# Patient Record
Sex: MALE | Race: WHITE | NOT HISPANIC OR LATINO | URBAN - METROPOLITAN AREA
[De-identification: names, ages, dates, MRNs, and addresses within clinical notes are randomized per-mention and may not be internally consistent; named-entity substitution may affect disease eponyms.]

---

## 2017-12-01 ENCOUNTER — EMERGENCY (EMERGENCY)
Facility: HOSPITAL | Age: 82
LOS: 1 days | Discharge: HOME | End: 2017-12-01
Attending: EMERGENCY MEDICINE | Admitting: EMERGENCY MEDICINE
Payer: MEDICARE

## 2017-12-01 VITALS
RESPIRATION RATE: 18 BRPM | OXYGEN SATURATION: 97 % | TEMPERATURE: 98 F | HEART RATE: 93 BPM | DIASTOLIC BLOOD PRESSURE: 96 MMHG | SYSTOLIC BLOOD PRESSURE: 149 MMHG

## 2017-12-01 DIAGNOSIS — S42.001A FRACTURE OF UNSPECIFIED PART OF RIGHT CLAVICLE, INITIAL ENCOUNTER FOR CLOSED FRACTURE: ICD-10-CM

## 2017-12-01 DIAGNOSIS — N39.0 URINARY TRACT INFECTION, SITE NOT SPECIFIED: ICD-10-CM
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
## 2017-12-01 DIAGNOSIS — R07.89 OTHER CHEST PAIN: ICD-10-CM

## 2017-12-01 DIAGNOSIS — B20 HUMAN IMMUNODEFICIENCY VIRUS [HIV] DISEASE: ICD-10-CM
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
## 2017-12-01 DIAGNOSIS — S09.90XA UNSPECIFIED INJURY OF HEAD, INITIAL ENCOUNTER: ICD-10-CM

## 2017-12-01 DIAGNOSIS — Z88.1 ALLERGY STATUS TO OTHER ANTIBIOTIC AGENTS STATUS: ICD-10-CM

## 2017-12-01 DIAGNOSIS — M25.552 PAIN IN LEFT HIP: ICD-10-CM

## 2017-12-01 LAB
ALBUMIN SERPL ELPH-MCNC: 4 G/DL — SIGNIFICANT CHANGE UP (ref 3.3–5)
ALP SERPL-CCNC: 61 U/L — SIGNIFICANT CHANGE UP (ref 40–120)
ALT FLD-CCNC: 22 U/L — SIGNIFICANT CHANGE UP (ref 10–45)
ANION GAP SERPL CALC-SCNC: 14 MMOL/L — SIGNIFICANT CHANGE UP (ref 5–17)
APPEARANCE UR: (no result)
AST SERPL-CCNC: 27 U/L — SIGNIFICANT CHANGE UP (ref 10–40)
BASOPHILS NFR BLD AUTO: 0.2 % — SIGNIFICANT CHANGE UP (ref 0–2)
BILIRUB SERPL-MCNC: 0.5 MG/DL — SIGNIFICANT CHANGE UP (ref 0.2–1.2)
BILIRUB UR-MCNC: NEGATIVE — SIGNIFICANT CHANGE UP
BUN SERPL-MCNC: 35 MG/DL — HIGH (ref 7–23)
CALCIUM SERPL-MCNC: 9.2 MG/DL — SIGNIFICANT CHANGE UP (ref 8.4–10.5)
CHLORIDE SERPL-SCNC: 102 MMOL/L — SIGNIFICANT CHANGE UP (ref 96–108)
CO2 SERPL-SCNC: 22 MMOL/L — SIGNIFICANT CHANGE UP (ref 22–31)
COLOR SPEC: YELLOW — SIGNIFICANT CHANGE UP
CREAT SERPL-MCNC: 1.63 MG/DL — HIGH (ref 0.5–1.3)
DIFF PNL FLD: (no result)
EOSINOPHIL NFR BLD AUTO: 0.8 % — SIGNIFICANT CHANGE UP (ref 0–6)
GLUCOSE SERPL-MCNC: 112 MG/DL — HIGH (ref 70–99)
GLUCOSE UR QL: NEGATIVE — SIGNIFICANT CHANGE UP
HCT VFR BLD CALC: 37.4 % — LOW (ref 39–50)
HGB BLD-MCNC: 12.7 G/DL — LOW (ref 13–17)
KETONES UR-MCNC: 15 MG/DL
LEUKOCYTE ESTERASE UR-ACNC: (no result)
LYMPHOCYTES # BLD AUTO: 13.7 % — SIGNIFICANT CHANGE UP (ref 13–44)
MCHC RBC-ENTMCNC: 32.5 PG — SIGNIFICANT CHANGE UP (ref 27–34)
MCHC RBC-ENTMCNC: 34 G/DL — SIGNIFICANT CHANGE UP (ref 32–36)
MCV RBC AUTO: 95.7 FL — SIGNIFICANT CHANGE UP (ref 80–100)
MONOCYTES NFR BLD AUTO: 11.4 % — SIGNIFICANT CHANGE UP (ref 2–14)
NEUTROPHILS NFR BLD AUTO: 73.9 % — SIGNIFICANT CHANGE UP (ref 43–77)
NITRITE UR-MCNC: POSITIVE
PH UR: 6 — SIGNIFICANT CHANGE UP (ref 5–8)
PLATELET # BLD AUTO: 246 K/UL — SIGNIFICANT CHANGE UP (ref 150–400)
POTASSIUM SERPL-MCNC: 4.6 MMOL/L — SIGNIFICANT CHANGE UP (ref 3.5–5.3)
POTASSIUM SERPL-SCNC: 4.6 MMOL/L — SIGNIFICANT CHANGE UP (ref 3.5–5.3)
PROT SERPL-MCNC: 7.6 G/DL — SIGNIFICANT CHANGE UP (ref 6–8.3)
PROT UR-MCNC: NEGATIVE MG/DL — SIGNIFICANT CHANGE UP
RBC # BLD: 3.91 M/UL — LOW (ref 4.2–5.8)
RBC # FLD: 12.5 % — SIGNIFICANT CHANGE UP (ref 10.3–16.9)
SODIUM SERPL-SCNC: 138 MMOL/L — SIGNIFICANT CHANGE UP (ref 135–145)
SP GR SPEC: >=1.03 — SIGNIFICANT CHANGE UP (ref 1–1.03)
UROBILINOGEN FLD QL: 0.2 E.U./DL — SIGNIFICANT CHANGE UP
WBC # BLD: 10 K/UL — SIGNIFICANT CHANGE UP (ref 3.8–10.5)
WBC # FLD AUTO: 10 K/UL — SIGNIFICANT CHANGE UP (ref 3.8–10.5)

## 2017-12-01 PROCEDURE — 87086 URINE CULTURE/COLONY COUNT: CPT

## 2017-12-01 PROCEDURE — 70450 CT HEAD/BRAIN W/O DYE: CPT

## 2017-12-01 PROCEDURE — 70450 CT HEAD/BRAIN W/O DYE: CPT | Mod: 26

## 2017-12-01 PROCEDURE — 99284 EMERGENCY DEPT VISIT MOD MDM: CPT

## 2017-12-01 PROCEDURE — 71250 CT THORAX DX C-: CPT | Mod: 26

## 2017-12-01 PROCEDURE — 96374 THER/PROPH/DIAG INJ IV PUSH: CPT

## 2017-12-01 PROCEDURE — 99284 EMERGENCY DEPT VISIT MOD MDM: CPT | Mod: 25

## 2017-12-01 PROCEDURE — 81001 URINALYSIS AUTO W/SCOPE: CPT

## 2017-12-01 PROCEDURE — 36415 COLL VENOUS BLD VENIPUNCTURE: CPT

## 2017-12-01 PROCEDURE — 73523 X-RAY EXAM HIPS BI 5/> VIEWS: CPT

## 2017-12-01 PROCEDURE — 73523 X-RAY EXAM HIPS BI 5/> VIEWS: CPT | Mod: 26

## 2017-12-01 PROCEDURE — 85025 COMPLETE CBC W/AUTO DIFF WBC: CPT

## 2017-12-01 PROCEDURE — 87040 BLOOD CULTURE FOR BACTERIA: CPT

## 2017-12-01 PROCEDURE — 71250 CT THORAX DX C-: CPT

## 2017-12-01 PROCEDURE — 87186 SC STD MICRODIL/AGAR DIL: CPT

## 2017-12-01 PROCEDURE — 80053 COMPREHEN METABOLIC PANEL: CPT

## 2017-12-01 RX ORDER — ACETAMINOPHEN 500 MG
650 TABLET ORAL ONCE
Qty: 0 | Refills: 0 | Status: COMPLETED | OUTPATIENT
Start: 2017-12-01 | End: 2017-12-01

## 2017-12-01 RX ORDER — CEPHALEXIN 500 MG
1 CAPSULE ORAL
Qty: 14 | Refills: 0 | OUTPATIENT
Start: 2017-12-01 | End: 2017-12-08

## 2017-12-01 RX ORDER — CEFTRIAXONE 500 MG/1
1 INJECTION, POWDER, FOR SOLUTION INTRAMUSCULAR; INTRAVENOUS ONCE
Qty: 0 | Refills: 0 | Status: COMPLETED | OUTPATIENT
Start: 2017-12-01 | End: 2017-12-01

## 2017-12-01 RX ADMIN — Medication 650 MILLIGRAM(S): at 23:30

## 2017-12-01 RX ADMIN — CEFTRIAXONE 100 GRAM(S): 500 INJECTION, POWDER, FOR SOLUTION INTRAMUSCULAR; INTRAVENOUS at 23:35

## 2017-12-01 NOTE — ED PROVIDER NOTE - OBJECTIVE STATEMENT
84 yo  male with h/o HIV, in the ER after mechanical fall at the hallway of his building earlier today.   Pt reports that he had difficulty to got up. C/o head injury, but no LOC, c/o pain to his  right anterior upper chest, left hip. Pt denies nausea, vomiting, SOB, abdominal pain, fever or chills. Pt reports that he slipped on the floor. Had another fall last week on the stairs. Pt also reports that last month her was treated for pneumonia

## 2017-12-01 NOTE — ED PROVIDER NOTE - CARE PLAN
Principal Discharge DX:	Clavicular fracture  Secondary Diagnosis:	HIV (human immunodeficiency virus infection)  Secondary Diagnosis:	UTI (urinary tract infection)

## 2017-12-01 NOTE — ED ADULT NURSE NOTE - OBJECTIVE STATEMENT
s/p trip and fall at the [address] of his building with right collar bone pain hit his head as well denies loc, been falling recently fell twice last week

## 2017-12-01 NOTE — ED PROVIDER NOTE - MEDICAL DECISION MAKING DETAILS
82 yo male s/p mechanical fall, no LOC, able to ambulate after his fall, c/o pain to anterior  right upper chest. head CT- no acute pathology, chest CT- right clavicular comminuted fracture, no pneumothorax, no other acute findings.  Pt also found to have UTI and received first dose of abx. Pt stable for discharge. ambulate steady, A&O x 3, has PMD to f/u. returned precautions discussed.

## 2017-12-01 NOTE — ED PROVIDER NOTE - ATTENDING CONTRIBUTION TO CARE
83 yom pw fall, c/o pain to R upper chest.  no cp, no sob, no palpitations.      agree w/ PA, avss, no tachypnea, no hypoxia, ambulatory steadily, will check CT and xray 83 yom pw fall, c/o pain to R upper chest.  no cp, no sob, no palpitations.      agree w/ PA, avss, no tachypnea, no hypoxia, ambulatory steadily, will check CT and xray    noted fx, sling, no ptx on exam, ambulating steadily, seeking dc, return precautions given

## 2017-12-01 NOTE — ED PROVIDER NOTE - MUSCULOSKELETAL, MLM
Spine appears normal, range of motion is not limited,  right clavicular bone tenderness+,  mild diffuse left hip tenderness, NROM+

## 2017-12-01 NOTE — ED ADULT TRIAGE NOTE - CHIEF COMPLAINT QUOTE
pt c/o  fall which occurred earlier at home today , pt states " I tripped and fell on my Right side  and I also hit my head a little " pt c/o Right collar bone pain , pt also states ' I have been falling a lot lately fell twice last week

## 2017-12-02 VITALS
OXYGEN SATURATION: 95 % | RESPIRATION RATE: 16 BRPM | HEART RATE: 90 BPM | DIASTOLIC BLOOD PRESSURE: 86 MMHG | SYSTOLIC BLOOD PRESSURE: 126 MMHG | TEMPERATURE: 98 F

## 2017-12-04 LAB
-  AMPICILLIN/SULBACTAM: SIGNIFICANT CHANGE UP
-  AMPICILLIN: SIGNIFICANT CHANGE UP
-  CEFAZOLIN: SIGNIFICANT CHANGE UP
-  CEFTRIAXONE: SIGNIFICANT CHANGE UP
-  CIPROFLOXACIN: SIGNIFICANT CHANGE UP
-  GENTAMICIN: SIGNIFICANT CHANGE UP
-  NITROFURANTOIN: SIGNIFICANT CHANGE UP
-  PIPERACILLIN/TAZOBACTAM: SIGNIFICANT CHANGE UP
-  TOBRAMYCIN: SIGNIFICANT CHANGE UP
-  TRIMETHOPRIM/SULFAMETHOXAZOLE: SIGNIFICANT CHANGE UP
CULTURE RESULTS: SIGNIFICANT CHANGE UP
METHOD TYPE: SIGNIFICANT CHANGE UP
ORGANISM # SPEC MICROSCOPIC CNT: SIGNIFICANT CHANGE UP
ORGANISM # SPEC MICROSCOPIC CNT: SIGNIFICANT CHANGE UP
SPECIMEN SOURCE: SIGNIFICANT CHANGE UP

## 2017-12-05 RX ORDER — METHENAMINE MANDELATE 1 G
1 TABLET ORAL
Qty: 14 | Refills: 0 | OUTPATIENT
Start: 2017-12-05 | End: 2017-12-12

## 2017-12-07 LAB
CULTURE RESULTS: SIGNIFICANT CHANGE UP
CULTURE RESULTS: SIGNIFICANT CHANGE UP
SPECIMEN SOURCE: SIGNIFICANT CHANGE UP
SPECIMEN SOURCE: SIGNIFICANT CHANGE UP

## 2019-01-31 ENCOUNTER — INPATIENT (INPATIENT)
Facility: HOSPITAL | Age: 84
LOS: 2 days | Discharge: NURSING, REHAB OR LONG-TERM CARE | DRG: 871 | End: 2019-02-03
Attending: INTERNAL MEDICINE | Admitting: INTERNAL MEDICINE
Payer: MEDICARE

## 2019-01-31 VITALS
SYSTOLIC BLOOD PRESSURE: 90 MMHG | OXYGEN SATURATION: 99 % | TEMPERATURE: 97 F | RESPIRATION RATE: 18 BRPM | DIASTOLIC BLOOD PRESSURE: 58 MMHG | HEART RATE: 96 BPM

## 2019-01-31 DIAGNOSIS — B20 HUMAN IMMUNODEFICIENCY VIRUS [HIV] DISEASE: ICD-10-CM

## 2019-01-31 DIAGNOSIS — J15.9 UNSPECIFIED BACTERIAL PNEUMONIA: ICD-10-CM

## 2019-01-31 DIAGNOSIS — R53.1 WEAKNESS: ICD-10-CM

## 2019-01-31 DIAGNOSIS — A41.9 SEPSIS, UNSPECIFIED ORGANISM: ICD-10-CM

## 2019-01-31 DIAGNOSIS — N19 UNSPECIFIED KIDNEY FAILURE: ICD-10-CM

## 2019-01-31 DIAGNOSIS — D64.9 ANEMIA, UNSPECIFIED: ICD-10-CM

## 2019-01-31 DIAGNOSIS — R79.89 OTHER SPECIFIED ABNORMAL FINDINGS OF BLOOD CHEMISTRY: ICD-10-CM

## 2019-01-31 DIAGNOSIS — Z91.89 OTHER SPECIFIED PERSONAL RISK FACTORS, NOT ELSEWHERE CLASSIFIED: ICD-10-CM

## 2019-01-31 DIAGNOSIS — R33.9 RETENTION OF URINE, UNSPECIFIED: ICD-10-CM

## 2019-01-31 DIAGNOSIS — R63.8 OTHER SYMPTOMS AND SIGNS CONCERNING FOOD AND FLUID INTAKE: ICD-10-CM

## 2019-01-31 LAB
ALBUMIN SERPL ELPH-MCNC: 3.3 G/DL — LOW (ref 3.4–5)
ALP SERPL-CCNC: 54 U/L — SIGNIFICANT CHANGE UP (ref 40–120)
ALT FLD-CCNC: 14 U/L — SIGNIFICANT CHANGE UP (ref 12–42)
ANION GAP SERPL CALC-SCNC: 15 MMOL/L — SIGNIFICANT CHANGE UP (ref 9–16)
APPEARANCE UR: CLEAR — SIGNIFICANT CHANGE UP
AST SERPL-CCNC: 24 U/L — SIGNIFICANT CHANGE UP (ref 15–37)
BASOPHILS NFR BLD AUTO: 0.2 % — SIGNIFICANT CHANGE UP (ref 0–2)
BILIRUB SERPL-MCNC: 0.4 MG/DL — SIGNIFICANT CHANGE UP (ref 0.2–1.2)
BILIRUB UR-MCNC: NEGATIVE — SIGNIFICANT CHANGE UP
BUN SERPL-MCNC: 84 MG/DL — HIGH (ref 7–23)
CALCIUM SERPL-MCNC: 9.3 MG/DL — SIGNIFICANT CHANGE UP (ref 8.5–10.5)
CHLORIDE SERPL-SCNC: 110 MMOL/L — HIGH (ref 96–108)
CO2 SERPL-SCNC: 19 MMOL/L — LOW (ref 22–31)
COLOR SPEC: YELLOW — SIGNIFICANT CHANGE UP
CREAT SERPL-MCNC: 1.33 MG/DL — HIGH (ref 0.5–1.3)
DIFF PNL FLD: NEGATIVE — SIGNIFICANT CHANGE UP
EOSINOPHIL NFR BLD AUTO: 0.2 % — SIGNIFICANT CHANGE UP (ref 0–6)
FLU A RESULT: SIGNIFICANT CHANGE UP
FLU A RESULT: SIGNIFICANT CHANGE UP
FLUAV AG NPH QL: SIGNIFICANT CHANGE UP
FLUBV AG NPH QL: SIGNIFICANT CHANGE UP
GLUCOSE SERPL-MCNC: 142 MG/DL — HIGH (ref 70–99)
GLUCOSE UR QL: NEGATIVE — SIGNIFICANT CHANGE UP
HCT VFR BLD CALC: 30.6 % — LOW (ref 39–50)
HGB BLD-MCNC: 10.3 G/DL — LOW (ref 13–17)
IMM GRANULOCYTES NFR BLD AUTO: 0.7 % — SIGNIFICANT CHANGE UP (ref 0–1.5)
KETONES UR-MCNC: 15 MG/DL
LACTATE SERPL-SCNC: 1.2 MMOL/L — SIGNIFICANT CHANGE UP (ref 0.4–2)
LACTATE SERPL-SCNC: 2.8 MMOL/L — HIGH (ref 0.4–2)
LEUKOCYTE ESTERASE UR-ACNC: NEGATIVE — SIGNIFICANT CHANGE UP
LYMPHOCYTES # BLD AUTO: 7.7 % — LOW (ref 13–44)
MAGNESIUM SERPL-MCNC: 2.1 MG/DL — SIGNIFICANT CHANGE UP (ref 1.6–2.6)
MCHC RBC-ENTMCNC: 33.3 PG — SIGNIFICANT CHANGE UP (ref 27–34)
MCHC RBC-ENTMCNC: 33.7 G/DL — SIGNIFICANT CHANGE UP (ref 32–36)
MCV RBC AUTO: 99 FL — SIGNIFICANT CHANGE UP (ref 80–100)
MONOCYTES NFR BLD AUTO: 7.2 % — SIGNIFICANT CHANGE UP (ref 2–14)
NEUTROPHILS NFR BLD AUTO: 84 % — HIGH (ref 43–77)
NITRITE UR-MCNC: NEGATIVE — SIGNIFICANT CHANGE UP
PCP SPEC-MCNC: SIGNIFICANT CHANGE UP
PH UR: 5.5 — SIGNIFICANT CHANGE UP (ref 5–8)
PHOSPHATE SERPL-MCNC: 3.2 MG/DL — SIGNIFICANT CHANGE UP (ref 2.5–4.5)
PLATELET # BLD AUTO: 276 K/UL — SIGNIFICANT CHANGE UP (ref 150–400)
POTASSIUM SERPL-MCNC: 4.1 MMOL/L — SIGNIFICANT CHANGE UP (ref 3.5–5.3)
POTASSIUM SERPL-SCNC: 4.1 MMOL/L — SIGNIFICANT CHANGE UP (ref 3.5–5.3)
PROT SERPL-MCNC: 6.3 G/DL — LOW (ref 6.4–8.2)
PROT UR-MCNC: NEGATIVE MG/DL — SIGNIFICANT CHANGE UP
RAPID RVP RESULT: SIGNIFICANT CHANGE UP
RBC # BLD: 3.09 M/UL — LOW (ref 4.2–5.8)
RBC # FLD: 12.8 % — SIGNIFICANT CHANGE UP (ref 10.3–14.5)
RSV RESULT: SIGNIFICANT CHANGE UP
RSV RNA RESP QL NAA+PROBE: SIGNIFICANT CHANGE UP
SODIUM SERPL-SCNC: 144 MMOL/L — SIGNIFICANT CHANGE UP (ref 132–145)
SP GR SPEC: 1.02 — SIGNIFICANT CHANGE UP (ref 1–1.03)
TROPONIN I SERPL-MCNC: 0.03 NG/ML — SIGNIFICANT CHANGE UP (ref 0.02–0.06)
UROBILINOGEN FLD QL: 0.2 E.U./DL — SIGNIFICANT CHANGE UP
WBC # BLD: 12.1 K/UL — HIGH (ref 3.8–10.5)
WBC # FLD AUTO: 12.1 K/UL — HIGH (ref 3.8–10.5)

## 2019-01-31 PROCEDURE — 99223 1ST HOSP IP/OBS HIGH 75: CPT | Mod: GC

## 2019-01-31 PROCEDURE — 93010 ELECTROCARDIOGRAM REPORT: CPT

## 2019-01-31 PROCEDURE — 71045 X-RAY EXAM CHEST 1 VIEW: CPT | Mod: 26,77

## 2019-01-31 PROCEDURE — 99285 EMERGENCY DEPT VISIT HI MDM: CPT | Mod: 25

## 2019-01-31 PROCEDURE — 71045 X-RAY EXAM CHEST 1 VIEW: CPT | Mod: 26

## 2019-01-31 PROCEDURE — 70450 CT HEAD/BRAIN W/O DYE: CPT | Mod: 26

## 2019-01-31 RX ORDER — PIPERACILLIN AND TAZOBACTAM 4; .5 G/20ML; G/20ML
3.38 INJECTION, POWDER, LYOPHILIZED, FOR SOLUTION INTRAVENOUS EVERY 6 HOURS
Qty: 0 | Refills: 0 | Status: DISCONTINUED | OUTPATIENT
Start: 2019-01-31 | End: 2019-02-02

## 2019-01-31 RX ORDER — EMTRICITABINE, RILPIVIRINE HYDROCHLORIDE, AND TENOFOVIR DISOPROXIL FUMARATE 200; 25; 300 MG/1; MG/1; MG/1
1 TABLET, FILM COATED ORAL DAILY
Qty: 0 | Refills: 0 | Status: DISCONTINUED | OUTPATIENT
Start: 2019-01-31 | End: 2019-02-03

## 2019-01-31 RX ORDER — VANCOMYCIN HCL 1 G
1000 VIAL (EA) INTRAVENOUS DAILY
Qty: 0 | Refills: 0 | Status: DISCONTINUED | OUTPATIENT
Start: 2019-01-31 | End: 2019-02-01

## 2019-01-31 RX ORDER — AZITHROMYCIN 500 MG/1
500 TABLET, FILM COATED ORAL ONCE
Qty: 0 | Refills: 0 | Status: COMPLETED | OUTPATIENT
Start: 2019-01-31 | End: 2019-01-31

## 2019-01-31 RX ORDER — SODIUM CHLORIDE 9 MG/ML
1000 INJECTION INTRAMUSCULAR; INTRAVENOUS; SUBCUTANEOUS ONCE
Qty: 0 | Refills: 0 | Status: COMPLETED | OUTPATIENT
Start: 2019-01-31 | End: 2019-01-31

## 2019-01-31 RX ORDER — ACETAMINOPHEN 500 MG
650 TABLET ORAL ONCE
Qty: 0 | Refills: 0 | Status: COMPLETED | OUTPATIENT
Start: 2019-01-31 | End: 2019-01-31

## 2019-01-31 RX ORDER — VANCOMYCIN HCL 1 G
VIAL (EA) INTRAVENOUS
Qty: 0 | Refills: 0 | Status: DISCONTINUED | OUTPATIENT
Start: 2019-01-31 | End: 2019-01-31

## 2019-01-31 RX ORDER — HEPARIN SODIUM 5000 [USP'U]/ML
5000 INJECTION INTRAVENOUS; SUBCUTANEOUS EVERY 8 HOURS
Qty: 0 | Refills: 0 | Status: DISCONTINUED | OUTPATIENT
Start: 2019-01-31 | End: 2019-02-03

## 2019-01-31 RX ORDER — CEFTRIAXONE 500 MG/1
1 INJECTION, POWDER, FOR SOLUTION INTRAMUSCULAR; INTRAVENOUS ONCE
Qty: 0 | Refills: 0 | Status: COMPLETED | OUTPATIENT
Start: 2019-01-31 | End: 2019-01-31

## 2019-01-31 RX ADMIN — SODIUM CHLORIDE 1000 MILLILITER(S): 9 INJECTION INTRAMUSCULAR; INTRAVENOUS; SUBCUTANEOUS at 15:46

## 2019-01-31 RX ADMIN — AZITHROMYCIN 500 MILLIGRAM(S): 500 TABLET, FILM COATED ORAL at 15:46

## 2019-01-31 RX ADMIN — CEFTRIAXONE 1 GRAM(S): 500 INJECTION, POWDER, FOR SOLUTION INTRAMUSCULAR; INTRAVENOUS at 12:30

## 2019-01-31 RX ADMIN — Medication 650 MILLIGRAM(S): at 16:33

## 2019-01-31 RX ADMIN — SODIUM CHLORIDE 2000 MILLILITER(S): 9 INJECTION INTRAMUSCULAR; INTRAVENOUS; SUBCUTANEOUS at 11:42

## 2019-01-31 RX ADMIN — AZITHROMYCIN 255 MILLIGRAM(S): 500 TABLET, FILM COATED ORAL at 12:22

## 2019-01-31 RX ADMIN — CEFTRIAXONE 100 GRAM(S): 500 INJECTION, POWDER, FOR SOLUTION INTRAMUSCULAR; INTRAVENOUS at 11:41

## 2019-01-31 RX ADMIN — SODIUM CHLORIDE 1000 MILLILITER(S): 9 INJECTION INTRAMUSCULAR; INTRAVENOUS; SUBCUTANEOUS at 16:33

## 2019-01-31 RX ADMIN — Medication 166.67 MILLIGRAM(S): at 21:44

## 2019-01-31 RX ADMIN — EMTRICITABINE, RILPIVIRINE HYDROCHLORIDE, AND TENOFOVIR DISOPROXIL FUMARATE 1 TABLET(S): 200; 25; 300 TABLET, FILM COATED ORAL at 21:45

## 2019-01-31 RX ADMIN — SODIUM CHLORIDE 1000 MILLILITER(S): 9 INJECTION INTRAMUSCULAR; INTRAVENOUS; SUBCUTANEOUS at 13:19

## 2019-01-31 NOTE — H&P ADULT - PROBLEM SELECTOR PLAN 1
Patient met severe sepsis criteria on admission given WBC 12.1, HR >90, lactate 2.8. Likely source is PNA given recent dx as outpatient, productive cough, infiltrate on CXR.  Patient was on 10 day course of Bactrim IV and PO (unclear exact days of each).  Patient straight caths at home but UA negative.  R hand erythema but more consistent with hematoma from IVs, less likely phlebitis.  No discharge  - given recent IV antibiotics would cover for HAP, gram negative pneumonia.  Also risk of aspiration given weakness and workup for Parkinsons.    - s/p Ceftriaxone 1gm and Azithromycin 500mg in the ED  - Vanc, Zosyn given risk factors as above  - repeat CXR  - s/p 3L NS in the ED, meets sepsis requirements  - lactate cleared from 2.8 to 1.2  - reperfusion exam performed  - f/u RVP, blood cxs Patient met severe sepsis criteria on admission given WBC 12.1 with neutrophil predom, HR >90, lactate 2.8. Likely source is PNA given recent dx as outpatient, productive cough, infiltrate on CXR.  Patient was on 10 day course of Bactrim IV and PO (unclear exact days of each).  Patient straight caths at home but UA negative.  R hand erythema but more consistent with hematoma from IVs, less likely phlebitis.  No discharge or warmth  - given recent IV antibiotics would cover for HAP, gram negative pneumonia.  Also risk of aspiration given weakness and workup for Parkinsons.    - s/p Ceftriaxone 1gm and Azithromycin 500mg in the ED  - Vanc 1000mg daily given Cr clearance 34, Zosyn 3.375g q6 given risk factors as above.  Likely can be de-escalated in AM   - repeat CXR  - s/p 3L NS in the ED, meets sepsis requirements  - lactate cleared from 2.8 to 1.2  - reperfusion exam performed  - f/u RVP, blood cxs  - f/u procalcitonin

## 2019-01-31 NOTE — ED PROVIDER NOTE - ATTENDING CONTRIBUTION TO CARE
Patient presenting with profound weakness in the setting of PNA tx x 10d as outpt, HIV with low CD4. VS- low BP, low grade temp, looks ill, non-toxic. Will tx as sepsis and check CXR. Likely admit.

## 2019-01-31 NOTE — ED PROVIDER NOTE - PHYSICAL EXAMINATION
R nare: enlarged flesh colored soft tissue mass that obstruct view of turbinates, no edema, no erythema,   no masses over throat, airway patent    L hand: large area of ecchymosis over medial aspect of dorsum, with central punture wound from venipuncture, mild surrounding erythema. no induration, no fluctuance

## 2019-01-31 NOTE — H&P ADULT - NSHPLABSRESULTS_GEN_ALL_CORE
LABS:                         10.3   12.1  )-----------( 276      ( 2019 11:26 )             30.6         144  |  110<H>  |  84<H>  ----------------------------<  142<H>  4.1   |  19<L>  |  1.33<H>    Ca    9.3      2019 11:26  Phos  3.2       Mg     2.1         TPro  6.3<L>  /  Alb  3.3<L>  /  TBili  0.4  /  DBili  x   /  AST  24  /  ALT  14  /  AlkPhos  54        Urinalysis Basic - ( 2019 12:22 )    Color: Yellow / Appearance: Clear / S.025 / pH: x  Gluc: x / Ketone: 15 mg/dL  / Bili: NEGATIVE / Urobili: 0.2 E.U./dL   Blood: x / Protein: NEGATIVE mg/dL / Nitrite: NEGATIVE   Leuk Esterase: NEGATIVE / RBC: x / WBC x   Sq Epi: x / Non Sq Epi: x / Bacteria: x      CARDIAC MARKERS ( 2019 11:26 )  0.028 ng/mL / x     / x     / x     / x            Lactate, Blood: 1.2 mmoL/L ( @ 14:06)  Lactate, Blood: 2.8 mmoL/L ( @ 11:26)      RADIOLOGY, EKG & ADDITIONAL TESTS: Reviewed.

## 2019-01-31 NOTE — H&P ADULT - PROBLEM SELECTOR PLAN 10
1) PCP Contacted on Admission: N --> Dr. Jacobo [phone], voicemail left for PCP  2) Date of Contact with PCP:  3) PCP Contacted at Discharge: (Y/N)  4) Summary of Handoff Given to PCP:   5) Post-Discharge Appointment Date and Location:

## 2019-01-31 NOTE — H&P ADULT - HISTORY OF PRESENT ILLNESS
85 yo M with PMH of HIV and chronic weakness being worked up for Parkinsons as an outpatient who comes in by EMS for acutely worsening weakness in the setting of current tx for PNA.  Patient reports that he was dx with PNA by his PCP last week and has been taking a 10 day course of Bactrim (combination of IV daily at PCP's office and transition to oral but unclear duration of IV vs oral).  Patient endorses a productive cough of light colored sputum and has had a runny nose which he reports is chronic.  Denies fevers, chills, CP, SOB, nausea, vomiting, abdominal pain or diarrhea.  He reports that in the last 24 hours he had acute worsening of fatigue and weakness which prompted his friends to be concerned and encourage him to go to the ED. He reports a history of generalized weakness for which he is being worked up as an outpatient for Parkinsons.    In the ED T max 99.4, HR 96, BP 90/58, RR 18, 99% on RA.  Patient received tylenol, azithromycin, ceftriaxone and 3L NS.  Labs significant for WBC 12.1 with neutrophil predominance, BUN/Cr 84/1.33, lactate 2.8, troponins negative.

## 2019-01-31 NOTE — ED PROVIDER NOTE - PROGRESS NOTE DETAILS
called PCP Dr Jacobo's office at  message left. called PCP Dr Jacobo's office at  message left. spoke with  Luis who states patient had been in the office almost daily for treatment of pneumonia for the past week. garret takes odessey, compliant with medications, last VL undetectably, last CD4 >200 does not know exact number or when he last check but is compliant with follow up.  patient does not want HIV status discussed in the presence of friend at bedside. patient takes odessey, compliant with medications, last VL undetectable, last CD4 >200 does not know exact number or when he last check but is compliant with follow up.  patient does not want HIV status discussed in the presence of friend at bedside.

## 2019-01-31 NOTE — H&P ADULT - NSHPREVIEWOFSYSTEMS_GEN_ALL_CORE
REVIEW OF SYSTEMS:    CONSTITUTIONAL: + weakness, No fevers or chills  EYES/ENT: + rhinorrhea, No visual changes;  No vertigo or throat pain   NECK: No pain or stiffness  RESPIRATORY: + cough, No wheezing or hemoptysis; No shortness of breath  CARDIOVASCULAR: No chest pain or palpitations  GASTROINTESTINAL: No abdominal or epigastric pain. No nausea, vomiting, or hematemesis; No diarrhea or constipation. No melena or hematochezia.  GENITOURINARY: + urinary retention, No dysuria, frequency or hematuria  NEUROLOGICAL: + weakness, No numbness  SKIN: No itching, burning, rashes, or lesions   All other review of systems is negative unless indicated above.

## 2019-01-31 NOTE — H&P ADULT - PROBLEM SELECTOR PLAN 7
Per  at PCP's office patient VL undetectable and CD4 > 200  - continue with Odefsey daily  - f/u with PCP to verify  - Patient would prefer that HIV status not be mentioned in front of visitors

## 2019-01-31 NOTE — H&P ADULT - PROBLEM SELECTOR PLAN 4
BUN elevated to 84 on admission, no signs of active bleeding.  - likely in the setting of ketogenic diet with possible GIANCARLO  - continue to monitor

## 2019-01-31 NOTE — ED PROVIDER NOTE - OBJECTIVE STATEMENT
PMHx HIV on antivirals BIB EMS for generalized weakness and profound fatigue worsening over the past 12-24 hours. patient was dx with PNA 10 days ago had been on bactrim x 4 days and had been getting IV abx at his PMD's office last dose 4 days ago. patient was able to follow up with his physician yesturday but found that he was profoundly weak today. states that he has been having issues with gait and dizziness x a few years and is being worked up for Parkinson's PMHx HIV on antivirals BIB EMS for generalized weakness and profound fatigue worsening over the past 12-24 hours. patient was dx with PNA 10 days ago had been on bactrim x 4 days and had been getting IV abx at his PMD's office last dose 4 days ago. patient was able to follow up with his physician yesterday but found that he was profoundly weak today. states that he has been having issues with gait and dizziness x a few years and is being worked up for Parkinson's

## 2019-01-31 NOTE — H&P ADULT - PROBLEM SELECTOR PLAN 3
Cr 1.33 on admission, no known baseline.  Patient with poor PO intake over last several days  - s/p 3L IVF in ED  - renally dose medications  - trend BMP

## 2019-01-31 NOTE — ED ADULT NURSE NOTE - OBJECTIVE STATEMENT
Pt presents to ED with c/o f/c and generalized weakness- currently being treated as OP for PNA, + productive cough

## 2019-01-31 NOTE — ED ADULT TRIAGE NOTE - CHIEF COMPLAINT QUOTE
Patient complaining of general weakness and unsteady gait for the last 12 hrs. Stroke code initiated.

## 2019-01-31 NOTE — H&P ADULT - PROBLEM SELECTOR PLAN 6
Hgb 10.3, 12.7 on labs 1 year ago  Normocytic, no signs of active bleeding.  Possibly in the setting of chronic disease HIV  - f/u iron panel  - monitor for signs and sxs of bleeding  - CT head negative for bleed

## 2019-01-31 NOTE — H&P ADULT - NSHPSOCIALHISTORY_GEN_ALL_CORE
Denies ever smoking, no EtOH, denies illicit drug usage.    Patient lives alone and performs his ADLS

## 2019-01-31 NOTE — ED PROVIDER NOTE - MEDICAL DECISION MAKING DETAILS
Patient PMHx HIV with PNA on bactrim x 4 days BIB EMS for generalized weakness and profound fatigue, no fall, no LOC. had been getting IV ABX in PCP's office. Patient PMHx HIV with PNA on bactrim x 4 days BIB EMS for generalized weakness and profound fatigue, no fall, no LOC. had been getting IV ABX in PCP's office. patient with PNA failing out patient PO and IV tx will likely need admission. will repeat xray, check labs, and contact PCP to discussed management plan

## 2019-01-31 NOTE — H&P ADULT - ASSESSMENT
85 yo M with PMH of HIV and chronic weakness being worked up for Parkinsons as an outpatient who comes in by EMS for acutely worsening weakness in the setting of current tx for PNA.  Patient meets severe sepsis criteria on admission with likely source of PNA.  Covering for HAP and gram negative PNA given recent IV abx, aspiration risk.  UA negative for UTI, less likely cellulitis or phlebitis at site of prior IV.

## 2019-01-31 NOTE — PATIENT PROFILE ADULT - NSPROMUTANXFEARADDRESSFT_GEN_A_NUR
[facility]... Recommended weight and BMI control through healthy diet and exercise, green leafy veggies, UV protection, and not smoking. Reviewed the benefits of AREDS VITAMINS VERUS GENOTYPE directed vitamin therapy, and recommended following one or the other to try and prevent the progression of the disease. I advised if patient smokes or has ever smoked to avoid high doses of vitamin A (beta carotene) due to increased risk of lung cancer. Reviewed the importance of daily monitoring of the vision in each eye independently, along with the use of the Amsler grid daily and instructed patient to call and return immediately for any new changes in their vision or on the Amsler grid. Patient instructed on the importance of regular follow up and monitoring for the early detection of conversion to wet AMD as early detection results in early treatment and better outcomes. None

## 2019-01-31 NOTE — H&P ADULT - PROBLEM SELECTOR PLAN 8
Hx of urinary retention at home requiring self straight cath for urination  - UA negative  - c/w straight cath q6

## 2019-01-31 NOTE — H&P ADULT - PROBLEM SELECTOR PLAN 2
- Coverage for gram negative and HAP as above  - Unlikely to be PCP PNA given CD4 > 200 at last known testing, compliance with medications and unilateral infiltrate on CXR

## 2019-01-31 NOTE — H&P ADULT - PROBLEM SELECTOR PLAN 5
Patient with generalized weakness. Reports he has been undergoing workup for Parkinson's as an outpatient  - PT consult  - Fall precautions Patient with generalized weakness. Reports he has been undergoing workup for Parkinson's as an outpatient  - PT consult  - Speech and swallow dysphagia screen  - Fall precautions Patient with generalized weakness. Reports he has been undergoing workup for Parkinson's as an outpatient  - CT head negative for acute pathology or bleed  - PT consult  - Speech and swallow dysphagia screen  - Fall precautions

## 2019-01-31 NOTE — H&P ADULT - NSHPPHYSICALEXAM_GEN_ALL_CORE
PHYSICAL EXAM:  Constitutional: NAD, comfortable in bed, frail elderly male  HEENT: NC/AT, PERRLA, EOMI, no conjunctival pallor or scleral icterus, MMM  Neck: Supple, no JVD  Respiratory: Good inspiratory effort, no increased work of breathing. CTA B/L. No w/r/r.   Cardiovascular: RRR, normal S1 and S2, no m/r/g.   Gastrointestinal: +BS, distended, tympanic, soft NT, no guarding or rebound tenderness, no palpable masses   Extremities: R hand with ecchymosis at site of prior IV, no increased warmth or tenderness.  wwp; no cyanosis, clubbing or edema.   Vascular: Pulses equal and strong throughout.   Neurological: AAOx3, no CN deficits, 4/5 strength b/l LEs, 4/5 UE strength, 5/5  strenght.  Gait deferred.  Sensation intact  Skin: No gross skin abnormalities or rashes

## 2019-01-31 NOTE — H&P ADULT - PROBLEM SELECTOR PLAN 9
F: none  E: replete electrolytes K< 4, Mg <2  N: Mechanical soft  DVT PPx: Hep SQ  Code status: Full Code

## 2019-02-01 LAB
ANION GAP SERPL CALC-SCNC: 10 MMOL/L — SIGNIFICANT CHANGE UP (ref 5–17)
BUN SERPL-MCNC: 48 MG/DL — HIGH (ref 7–23)
CALCIUM SERPL-MCNC: 8.5 MG/DL — SIGNIFICANT CHANGE UP (ref 8.4–10.5)
CHLORIDE SERPL-SCNC: 116 MMOL/L — HIGH (ref 96–108)
CO2 SERPL-SCNC: 21 MMOL/L — LOW (ref 22–31)
CREAT SERPL-MCNC: 1.08 MG/DL — SIGNIFICANT CHANGE UP (ref 0.5–1.3)
CULTURE RESULTS: NO GROWTH — SIGNIFICANT CHANGE UP
FERRITIN SERPL-MCNC: 77 NG/ML — SIGNIFICANT CHANGE UP (ref 30–400)
GLUCOSE SERPL-MCNC: 100 MG/DL — HIGH (ref 70–99)
HCT VFR BLD CALC: 25.1 % — LOW (ref 39–50)
HGB BLD-MCNC: 8.2 G/DL — LOW (ref 13–17)
IRON SATN MFR SERPL: 36 % — SIGNIFICANT CHANGE UP (ref 16–55)
IRON SATN MFR SERPL: 74 UG/DL — SIGNIFICANT CHANGE UP (ref 45–165)
MAGNESIUM SERPL-MCNC: 2.1 MG/DL — SIGNIFICANT CHANGE UP (ref 1.6–2.6)
MCHC RBC-ENTMCNC: 32.7 G/DL — SIGNIFICANT CHANGE UP (ref 32–36)
MCHC RBC-ENTMCNC: 32.7 PG — SIGNIFICANT CHANGE UP (ref 27–34)
MCV RBC AUTO: 100 FL — SIGNIFICANT CHANGE UP (ref 80–100)
PHOSPHATE SERPL-MCNC: 2.9 MG/DL — SIGNIFICANT CHANGE UP (ref 2.5–4.5)
PLATELET # BLD AUTO: 246 K/UL — SIGNIFICANT CHANGE UP (ref 150–400)
POTASSIUM SERPL-MCNC: 3.7 MMOL/L — SIGNIFICANT CHANGE UP (ref 3.5–5.3)
POTASSIUM SERPL-SCNC: 3.7 MMOL/L — SIGNIFICANT CHANGE UP (ref 3.5–5.3)
PROCALCITONIN SERPL-MCNC: 0.17 NG/ML — HIGH (ref 0.02–0.1)
RBC # BLD: 2.51 M/UL — LOW (ref 4.2–5.8)
RBC # FLD: 12.7 % — SIGNIFICANT CHANGE UP (ref 10.3–16.9)
SODIUM SERPL-SCNC: 147 MMOL/L — HIGH (ref 135–145)
SPECIMEN SOURCE: SIGNIFICANT CHANGE UP
TIBC SERPL-MCNC: 208 UG/DL — LOW (ref 220–430)
TRANSFERRIN SERPL-MCNC: 172 MG/DL — LOW (ref 200–360)
UIBC SERPL-MCNC: 134 UG/DL — SIGNIFICANT CHANGE UP (ref 110–370)
WBC # BLD: 7.9 K/UL — SIGNIFICANT CHANGE UP (ref 3.8–10.5)
WBC # FLD AUTO: 7.9 K/UL — SIGNIFICANT CHANGE UP (ref 3.8–10.5)

## 2019-02-01 PROCEDURE — 99233 SBSQ HOSP IP/OBS HIGH 50: CPT | Mod: GC

## 2019-02-01 RX ORDER — DOCUSATE SODIUM 100 MG
100 CAPSULE ORAL DAILY
Qty: 0 | Refills: 0 | Status: DISCONTINUED | OUTPATIENT
Start: 2019-02-01 | End: 2019-02-03

## 2019-02-01 RX ORDER — ERGOCALCIFEROL 1.25 MG/1
50000 CAPSULE ORAL
Qty: 0 | Refills: 0 | Status: DISCONTINUED | OUTPATIENT
Start: 2019-02-01 | End: 2019-02-03

## 2019-02-01 RX ORDER — ACETAMINOPHEN 500 MG
650 TABLET ORAL ONCE
Qty: 0 | Refills: 0 | Status: COMPLETED | OUTPATIENT
Start: 2019-02-01 | End: 2019-02-01

## 2019-02-01 RX ORDER — SENNA PLUS 8.6 MG/1
2 TABLET ORAL AT BEDTIME
Qty: 0 | Refills: 0 | Status: DISCONTINUED | OUTPATIENT
Start: 2019-02-01 | End: 2019-02-03

## 2019-02-01 RX ORDER — ATORVASTATIN CALCIUM 80 MG/1
10 TABLET, FILM COATED ORAL AT BEDTIME
Qty: 0 | Refills: 0 | Status: DISCONTINUED | OUTPATIENT
Start: 2019-02-01 | End: 2019-02-03

## 2019-02-01 RX ORDER — SIMETHICONE 80 MG/1
80 TABLET, CHEWABLE ORAL THREE TIMES A DAY
Qty: 0 | Refills: 0 | Status: DISCONTINUED | OUTPATIENT
Start: 2019-02-01 | End: 2019-02-03

## 2019-02-01 RX ORDER — POTASSIUM CHLORIDE 20 MEQ
40 PACKET (EA) ORAL ONCE
Qty: 0 | Refills: 0 | Status: COMPLETED | OUTPATIENT
Start: 2019-02-01 | End: 2019-02-01

## 2019-02-01 RX ORDER — POLYETHYLENE GLYCOL 3350 17 G/17G
17 POWDER, FOR SOLUTION ORAL DAILY
Qty: 0 | Refills: 0 | Status: DISCONTINUED | OUTPATIENT
Start: 2019-02-01 | End: 2019-02-03

## 2019-02-01 RX ADMIN — Medication 40 MILLIEQUIVALENT(S): at 08:30

## 2019-02-01 RX ADMIN — ATORVASTATIN CALCIUM 10 MILLIGRAM(S): 80 TABLET, FILM COATED ORAL at 22:25

## 2019-02-01 RX ADMIN — PIPERACILLIN AND TAZOBACTAM 200 GRAM(S): 4; .5 INJECTION, POWDER, LYOPHILIZED, FOR SOLUTION INTRAVENOUS at 05:49

## 2019-02-01 RX ADMIN — Medication 100 MILLIGRAM(S): at 01:06

## 2019-02-01 RX ADMIN — EMTRICITABINE, RILPIVIRINE HYDROCHLORIDE, AND TENOFOVIR DISOPROXIL FUMARATE 1 TABLET(S): 200; 25; 300 TABLET, FILM COATED ORAL at 12:09

## 2019-02-01 RX ADMIN — PIPERACILLIN AND TAZOBACTAM 200 GRAM(S): 4; .5 INJECTION, POWDER, LYOPHILIZED, FOR SOLUTION INTRAVENOUS at 18:02

## 2019-02-01 RX ADMIN — Medication 650 MILLIGRAM(S): at 09:30

## 2019-02-01 RX ADMIN — Medication 650 MILLIGRAM(S): at 08:30

## 2019-02-01 RX ADMIN — SIMETHICONE 80 MILLIGRAM(S): 80 TABLET, CHEWABLE ORAL at 08:31

## 2019-02-01 RX ADMIN — SENNA PLUS 2 TABLET(S): 8.6 TABLET ORAL at 22:25

## 2019-02-01 RX ADMIN — PIPERACILLIN AND TAZOBACTAM 200 GRAM(S): 4; .5 INJECTION, POWDER, LYOPHILIZED, FOR SOLUTION INTRAVENOUS at 12:09

## 2019-02-01 RX ADMIN — SENNA PLUS 2 TABLET(S): 8.6 TABLET ORAL at 01:06

## 2019-02-01 RX ADMIN — PIPERACILLIN AND TAZOBACTAM 200 GRAM(S): 4; .5 INJECTION, POWDER, LYOPHILIZED, FOR SOLUTION INTRAVENOUS at 00:08

## 2019-02-01 RX ADMIN — POLYETHYLENE GLYCOL 3350 17 GRAM(S): 17 POWDER, FOR SOLUTION ORAL at 12:09

## 2019-02-01 RX ADMIN — ERGOCALCIFEROL 50000 UNIT(S): 1.25 CAPSULE ORAL at 18:03

## 2019-02-01 RX ADMIN — Medication 100 MILLIGRAM(S): at 12:09

## 2019-02-01 NOTE — PROGRESS NOTE ADULT - SUBJECTIVE AND OBJECTIVE BOX
OVERNIGHT EVENTS: HATTIE    SUBJECTIVE: Pt seen and examined at bedside. Endorses improvement in cough and decrease in sputum production (chalk-colored). Also complaining of constipation - had one BM yesterday, but it was small and required straining. Denies fever, chills, chest pain, shortness of breath, abdominal pain, n/v, numbness, weakness, tingling.     Vital Signs Last 12 Hrs  T(F): 97.8 (19 @ 05:42), Max: 99 (19 @ 22:00)  HR: 84 (19 @ 05:42) (77 - 84)  BP: 134/73 (19 @ 05:42) (130/75 - 134/73)  BP(mean): --  RR: 18 (19 @ 05:42) (18 - 20)  SpO2: 97% (19 @ 05:42) (97% - 99%)  I&O's Summary    2019 07:01  -  2019 07:00  --------------------------------------------------------  IN: 450 mL / OUT: 1950 mL / NET: -1500 mL    PHYSICAL EXAM:  Constitutional: Male, NAD, comfortable in bed.  HEENT: NC/AT, PERRLA, EOMI, no conjunctival pallor or scleral icterus, MMM  Neck: Supple, no JVD  Respiratory: Normal rate, rhythm, depth, effort. Decreased breath sounds on R side. No w/r/r. No egophony.    Cardiovascular: RRR, normal S1 and S2, no m/r/g.   Gastrointestinal: +BS, soft NTND, no guarding or rebound tenderness, no palpable masses   Extremities: wwp; no cyanosis, clubbing or edema. Ecchymosis on R hand.   Vascular: Pulses equal and strong throughout.   Neurological: AAOx3, no CN deficits, strength and sensation intact throughout.   Skin: No gross skin abnormalities or rashes    LABS:                        8.2    7.9   )-----------( 246      ( 2019 05:55 )             25.1     02-    147<H>  |  116<H>  |  48<H>  ----------------------------<  100<H>  3.7   |  21<L>  |  1.08    Ca    8.5      2019 05:55  Phos  2.9     -  Mg     2.1         TPro  6.3<L>  /  Alb  3.3<L>  /  TBili  0.4  /  DBili  x   /  AST  24  /  ALT  14  /  AlkPhos  54        Urinalysis Basic - ( 2019 12:22 )    Color: Yellow / Appearance: Clear / S.025 / pH: x  Gluc: x / Ketone: 15 mg/dL  / Bili: NEGATIVE / Urobili: 0.2 E.U./dL   Blood: x / Protein: NEGATIVE mg/dL / Nitrite: NEGATIVE   Leuk Esterase: NEGATIVE / RBC: x / WBC x   Sq Epi: x / Non Sq Epi: x / Bacteria: x    RADIOLOGY & ADDITIONAL TESTS:    < from: CT Head No Cont (19 @ 11:56) >  IMPRESSION: No intracranial hemorrhage or acute transcortical infarct.   Generalized volume loss with small vessel ischemic disease. 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 Polypoid soft tissue within the   right nasal cavity extending into the roof of the nasopharynx and   correlation with direct inspection is recommended.    < end of copied text >    MEDICATIONS  (STANDING):  docusate sodium 100 milliGRAM(s) Oral daily  emtricitabine 200 mG/rilpivirine 25 mG/tenofovir alafenamide 25 mG (ODEFSEY) Tablet 1 Tablet(s) Oral daily  heparin  Injectable 5000 Unit(s) SubCutaneous every 8 hours  piperacillin/tazobactam IVPB. 3.375 Gram(s) IV Intermittent every 6 hours  polyethylene glycol 3350 17 Gram(s) Oral daily  senna 2 Tablet(s) Oral at bedtime  vancomycin  IVPB 1000 milliGRAM(s) IV Intermittent daily    MEDICATIONS  (PRN):  simethicone 80 milliGRAM(s) Chew three times a day PRN Gas OVERNIGHT EVENTS: HATTIE    SUBJECTIVE: Pt seen and examined at bedside. Endorses improvement in cough and decrease in sputum production (chalk-colored). Also complaining of constipation - had one BM yesterday, but it was small and required straining. Denies fever, chills, chest pain, shortness of breath, abdominal pain, n/v, numbness, weakness, tingling.     Vital Signs Last 12 Hrs  T(F): 97.8 (19 @ 05:42), Max: 99 (19 @ 22:00)  HR: 84 (19 @ 05:42) (77 - 84)  BP: 134/73 (19 @ 05:42) (130/75 - 134/73)  BP(mean): --  RR: 18 (19 @ 05:42) (18 - 20)  SpO2: 97% (19 @ 05:42) (97% - 99%)  I&O's Summary    2019 07:01  -  2019 07:00  --------------------------------------------------------  IN: 450 mL / OUT: 1950 mL / NET: -1500 mL    PHYSICAL EXAM:  Constitutional: Male, NAD, comfortable in bed.  HEENT: NC/AT, PERRLA, EOMI, no conjunctival pallor or scleral icterus, MMM  Neck: Supple, no JVD  Respiratory: Normal rate, rhythm, depth, effort. Decreased breath sounds on R side. No w/r/r. No egophony.    Cardiovascular: RRR, normal S1 and S2, no m/r/g.   Gastrointestinal: +BS, soft NTND, no guarding or rebound tenderness, no palpable masses   Extremities: wwp; no cyanosis, clubbing or edema. Ecchymosis on L hand.   Vascular: Pulses equal and strong throughout.   Neurological: AAOx3, no CN deficits, strength and sensation intact throughout.   Skin: No gross skin abnormalities or rashes    LABS:                        8.2    7.9   )-----------( 246      ( 2019 05:55 )             25.1     02-    147<H>  |  116<H>  |  48<H>  ----------------------------<  100<H>  3.7   |  21<L>  |  1.08    Ca    8.5      2019 05:55  Phos  2.9     -  Mg     2.1         TPro  6.3<L>  /  Alb  3.3<L>  /  TBili  0.4  /  DBili  x   /  AST  24  /  ALT  14  /  AlkPhos  54        Urinalysis Basic - ( 2019 12:22 )    Color: Yellow / Appearance: Clear / S.025 / pH: x  Gluc: x / Ketone: 15 mg/dL  / Bili: NEGATIVE / Urobili: 0.2 E.U./dL   Blood: x / Protein: NEGATIVE mg/dL / Nitrite: NEGATIVE   Leuk Esterase: NEGATIVE / RBC: x / WBC x   Sq Epi: x / Non Sq Epi: x / Bacteria: x    RADIOLOGY & ADDITIONAL TESTS:    < from: CT Head No Cont (19 @ 11:56) >  IMPRESSION: No intracranial hemorrhage or acute transcortical infarct.   Generalized volume loss with small vessel ischemic disease. 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 Polypoid soft tissue within the   right nasal cavity extending into the roof of the nasopharynx and   correlation with direct inspection is recommended.    < end of copied text >    MEDICATIONS  (STANDING):  docusate sodium 100 milliGRAM(s) Oral daily  emtricitabine 200 mG/rilpivirine 25 mG/tenofovir alafenamide 25 mG (ODEFSEY) Tablet 1 Tablet(s) Oral daily  heparin  Injectable 5000 Unit(s) SubCutaneous every 8 hours  piperacillin/tazobactam IVPB. 3.375 Gram(s) IV Intermittent every 6 hours  polyethylene glycol 3350 17 Gram(s) Oral daily  senna 2 Tablet(s) Oral at bedtime  vancomycin  IVPB 1000 milliGRAM(s) IV Intermittent daily    MEDICATIONS  (PRN):  simethicone 80 milliGRAM(s) Chew three times a day PRN Gas

## 2019-02-01 NOTE — PHYSICAL THERAPY INITIAL EVALUATION ADULT - ADDITIONAL COMMENTS
Pt lives at home alone with elevator access, denies LAURITA. PTA: ~1-2 wks ago pt able to amb indep, no AD in community. Currently pt feels deconditioned and difficulty amb, only few steps at home when friends visit 2-3 x / wk. Left hand dominant

## 2019-02-01 NOTE — PHYSICAL THERAPY INITIAL EVALUATION ADULT - TRANSFER SAFETY CONCERNS NOTED: SIT/STAND, REHAB EVAL
decreased sequencing ability/decreased safety awareness/time to complete tasks/decreased weight-shifting ability

## 2019-02-01 NOTE — PROGRESS NOTE ADULT - PROBLEM SELECTOR PLAN 3
Cr 1.33 on admission, no known baseline.  Patient with poor PO intake over last several days  - s/p 3L IVF in ED  - renally dose medications  - trend BMP Cr 1.33 on admission, outpatient records with Cr 1.22 on 1/22.  Patient with poor PO intake over last several days  - s/p 3L IVF in ED  - renally dose medications  - trend BMP

## 2019-02-01 NOTE — DIETITIAN INITIAL EVALUATION ADULT. - PROBLEM SELECTOR PLAN 1
Patient met severe sepsis criteria on admission given WBC 12.1 with neutrophil predom, HR >90, lactate 2.8. Likely source is PNA given recent dx as outpatient, productive cough, infiltrate on CXR.  Patient was on 10 day course of Bactrim IV and PO (unclear exact days of each).  Patient straight caths at home but UA negative.  R hand erythema but more consistent with hematoma from IVs, less likely phlebitis.  No discharge or warmth  - given recent IV antibiotics would cover for HAP, gram negative pneumonia.  Also risk of aspiration given weakness and workup for Parkinsons.    - s/p Ceftriaxone 1gm and Azithromycin 500mg in the ED  - Vanc 1000mg daily given Cr clearance 34, Zosyn 3.375g q6 given risk factors as above.  Likely can be de-escalated in AM   - repeat CXR  - s/p 3L NS in the ED, meets sepsis requirements  - lactate cleared from 2.8 to 1.2  - reperfusion exam performed  - f/u RVP, blood cxs  - f/u procalcitonin

## 2019-02-01 NOTE — CHART NOTE - NSCHARTNOTEFT_GEN_A_CORE
Upon Nutritional Assessment by the Registered Dietitian your patient was determined to meet criteria / has evidence of the following diagnosis/diagnoses:          [ ]  Mild Protein Calorie Malnutrition        [ ]  Moderate Protein Calorie Malnutrition        [x ] Severe Protein Calorie Malnutrition        [ ] Unspecified Protein Calorie Malnutrition        [ x] Underweight / BMI <19        [ ] Morbid Obesity / BMI > 40      Findings as based on:  •  Comprehensive nutrition assessment and consultation  •  Calorie counts (nutrient intake analysis)  •  Food acceptance and intake status from observations by staff  •  Follow up  •  Patient education  •  Intervention secondary to interdisciplinary rounds  •   concerns      Treatment:    The following diet has been recommended:pureed with ensure enlive BID      PROVIDER Section:     By signing this assessment you are acknowledging and agree with the diagnosis/diagnoses assigned by the Registered Dietitian    Comments:

## 2019-02-01 NOTE — PROGRESS NOTE ADULT - PROBLEM SELECTOR PLAN 2
- Coverage for gram negative and HAP as above  - Unlikely to be PCP PNA given CD4 > 200 at last known testing, compliance with medications and unilateral infiltrate on CXR - Coverage for gram negative and HAP as above  - Per collateral, sputum cx + for few gram positive cocci in pairs, chains, and clusters, rare gram negative rods, rare yeast  - Unlikely to be PCP PNA given CD4 > 200 at last known testing, compliance with medications and ? unilateral infiltrate on CXR - will obtain collateral from PCP regarding CD4 count - Coverage for gram negative and HAP as above  - Per collateral, sputum cx + for few gram positive cocci in pairs, chains, and clusters, rare gram negative rods, rare yeast  - Unlikely to be PCP PNA given CD4 > 200 at last known testing, compliance with medications and ? unilateral infiltrate on CXR

## 2019-02-01 NOTE — DIETITIAN INITIAL EVALUATION ADULT. - ENERGY NEEDS
Ht:5ft 9inches,IBW:160lbs +/-10%,81% of IBW.BMI:19.1,86% of UBW.Increased kcal/protein and nutrient needs due to HIV+ with sepsis/Pneumonia and reported weight loss

## 2019-02-01 NOTE — PROGRESS NOTE ADULT - PROBLEM SELECTOR PLAN 1
Patient met severe sepsis criteria on admission given WBC 12.1 with neutrophil predominance, HR >90, lactate 2.8. Likely source is PNA given recent dx as outpatient, productive cough, infiltrate on CXR.  Patient was on 10 day course of Bactrim IV and PO (unclear exact days of each).  Patient straight caths at home but UA negative.  R hand erythema but more consistent with hematoma from IVs, less likely phlebitis.  No discharge or warmth  - given recent IV antibiotics would cover for HAP, gram negative pneumonia.  Also risk of aspiration given weakness and workup for Parkinsons.    - s/p Ceftriaxone 1gm and Azithromycin 500mg in the ED  - Vanc 1000mg daily given Cr clearance 34, Zosyn 3.375g q6 given risk factors as above.   - repeat CXR  - s/p 3L NS in the ED  - lactate cleared from 2.8 to 1.2  - reperfusion exam performed  - f/u RVP, blood cxs  - f/u procalcitonin Patient met severe sepsis criteria on admission given WBC 12.1 with neutrophil predominance, HR >90, lactate 2.8. Likely source is PNA given recent dx as outpatient, productive cough, infiltrate on CXR.  Patient was on a 10 day course of ceftriaxone 1g IV QD as outpt for PNA, along with doxycycline BID for days he could not make it into the office. Pt was also being treated with PO bactrim for a concomitant UTI. Patient straight caths at home but UA negative.  L hand erythema but more consistent with hematoma from IVs, less likely phlebitis.  No discharge or warmth. Lactate 2.8 on admission, cleared s/p 3L NS in ED.  - procalcitonin elevated to 0.17  - given recent IV antibiotics would cover for HAP, gram negative pneumonia.  Also risk of aspiration given weakness and workup for Parkinsons.    - s/p Ceftriaxone 1gm and Azithromycin 500mg in the ED  - Vanc discontinued  - c/w Zosyn 3.375g q6 given risk factors as above  - repeat CXR  - reperfusion exam performed  - f/u RVP, blood cxs

## 2019-02-01 NOTE — CONSULT NOTE ADULT - SUBJECTIVE AND OBJECTIVE BOX
Patient is a 84y old  Male who presents with a chief complaint of Severe sepsis (2019 08:41)       HPI:  83 yo M with PMH of HIV and chronic weakness being worked up for Parkinsons as an outpatient who comes in by EMS for acutely worsening weakness in the setting of current tx for PNA.  Patient reports that he was dx with PNA by his PCP last week and has been taking a 10 day course of Bactrim (combination of IV daily at PCP's office and transition to oral but unclear duration of IV vs oral).  Patient endorses a productive cough of light colored sputum and has had a runny nose which he reports is chronic.  Denies fevers, chills, CP, SOB, nausea, vomiting, abdominal pain or diarrhea.  He reports that in the last 24 hours he had acute worsening of fatigue and weakness which prompted his friends to be concerned and encourage him to go to the ED. He reports a history of generalized weakness for which he is being worked up as an outpatient for Parkinsons.    In the ED T max 99.4, HR 96, BP 90/58, RR 18, 99% on RA.  Patient received tylenol, azithromycin, ceftriaxone and 3L NS.  Labs significant for WBC 12.1 with neutrophil predominance, BUN/Cr 84/1.33, lactate 2.8, troponins negative. (2019 18:58)      PAST MEDICAL & SURGICAL HISTORY:  HIV (human immunodeficiency virus infection)  No significant past surgical history      MEDICATIONS  (STANDING):  docusate sodium 100 milliGRAM(s) Oral daily  emtricitabine 200 mG/rilpivirine 25 mG/tenofovir alafenamide 25 mG (ODEFSEY) Tablet 1 Tablet(s) Oral daily  heparin  Injectable 5000 Unit(s) SubCutaneous every 8 hours  piperacillin/tazobactam IVPB. 3.375 Gram(s) IV Intermittent every 6 hours  polyethylene glycol 3350 17 Gram(s) Oral daily  senna 2 Tablet(s) Oral at bedtime  vancomycin  IVPB 1000 milliGRAM(s) IV Intermittent daily    MEDICATIONS  (PRN):  simethicone 80 milliGRAM(s) Chew three times a day PRN Gas      Social History: lives alone in a walkup apartment, no home care services    Functional Level Prior to Admission: states he is ADL independent, walks with a cane    FAMILY HISTORY:  No pertinent family history in first degree relatives      CBC Full  -  ( 2019 05:55 )  WBC Count : 7.9 K/uL  Hemoglobin : 8.2 g/dL  Hematocrit : 25.1 %  Platelet Count - Automated : 246 K/uL  Mean Cell Volume : 100.0 fL  Mean Cell Hemoglobin : 32.7 pg  Mean Cell Hemoglobin Concentration : 32.7 g/dL  Auto Neutrophil # : x  Auto Lymphocyte # : x  Auto Monocyte # : x  Auto Eosinophil # : x  Auto Basophil # : x  Auto Neutrophil % : x  Auto Lymphocyte % : x  Auto Monocyte % : x  Auto Eosinophil % : x  Auto Basophil % : x      02-    147<H>  |  116<H>  |  48<H>  ----------------------------<  100<H>  3.7   |  21<L>  |  1.08    Ca    8.5      2019 05:55  Phos  2.9     02-  Mg     2.1         TPro  6.3<L>  /  Alb  3.3<L>  /  TBili  0.4  /  DBili  x   /  AST  24  /  ALT  14  /  AlkPhos  54        Urinalysis Basic - ( 2019 12:22 )    Color: Yellow / Appearance: Clear / S.025 / pH: x  Gluc: x / Ketone: 15 mg/dL  / Bili: NEGATIVE / Urobili: 0.2 E.U./dL   Blood: x / Protein: NEGATIVE mg/dL / Nitrite: NEGATIVE   Leuk Esterase: NEGATIVE / RBC: x / WBC x   Sq Epi: x / Non Sq Epi: x / Bacteria: x          Radiology:    < from: CT Head No Cont (19 @ 11:56) >  EXAM:  CT BRAIN                           PROCEDURE DATE:  2019          INTERPRETATION:  PROCEDURE: CT brain without intravenous contrast    INDICATION: Generalized weakness    TECHNIQUE: Multiple axial images were obtained at 5 mm intervalsfrom the   skull base to the vertex. Sagittal and coronal reformatted images were   obtained from the axial data set. The images were reviewed in brain and   bone windows.    COMPARISON: CT brain 2017    FINDINGS: The CT examination demonstratesgeneralized volume loss at the   ventricles are stable in size. There is no midline shift or extra axial   collections. The gray white differentiation appears within normal limits.   There is no intracranial hemorrhage or acute transcortical infarct.There   is a old infarct involving the right putamen and extending to the corona   radiata and body of the caudate nucleus. There is patchy areas of   hypodensity within the periventricular white matter which may represent   the sequela of small vessel ischemic disease.     There is polypoid soft tissue extending from the right nasal cavity   through the choana into the roof of the right nasopharynx which was   present on the prior study (series 3 image 5 and series 602 image 34).   Correlation with direct inspection is recommended. There is mild polypoid   mucosal thickening within the left maxillary sinus.     The lenses are absent bilaterally which may be secondary to prior   cataract surgery. There is left scleral calcification.    The bony windows demonstrates no fractures.  The mastoid air cells are   well aerated.    IMPRESSION: No intracranial hemorrhage or acute transcortical infarct.   Generalized volume loss with small vessel ischemic disease. 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 Polypoid soft tissue within the   right nasal cavity extending into the roof of the nasopharynx and   correlation with direct inspection is recommended.                  Vital Signs Last 24 Hrs  T(C): 36.6 (2019 05:42), Max: 37.4 (2019 16:29)  T(F): 97.8 (2019 05:42), Max: 99.4 (2019 16:29)  HR: 84 (2019 05:42) (77 - 96)  BP: 134/73 (2019 05:42) (90/58 - 134/73)  BP(mean): --  RR: 18 (2019 05:42) (16 - 20)  SpO2: 97% (2019 05:42) (97% - 99%)    REVIEW OF SYSTEMS:    CONSTITUTIONAL:  fatigue  EYES: No eye pain, visual disturbances, or discharge  ENMT:  No difficulty hearing, tinnitus, vertigo; No sinus or throat pain  NECK: No pain or stiffness  BREASTS: No pain, masses, or nipple discharge  RESPIRATORY: No cough, wheezing, chills or hemoptysis; No shortness of breath  CARDIOVASCULAR: No chest pain, palpitations, dizziness, or leg swelling  GASTROINTESTINAL: No abdominal or epigastric pain. No nausea, vomiting, or hematemesis; No diarrhea or constipation. No melena or hematochezia.  GENITOURINARY: No dysuria, frequency, hematuria, or incontinence  NEUROLOGICAL: No headaches, memory loss, loss of strength, numbness, or tremors  SKIN: No itching, burning, rashes, or lesions   LYMPH NODES: No enlarged glands  ENDOCRINE: No heat or cold intolerance; No hair loss  MUSCULOSKELETAL: No joint pain or swelling; No muscle, back, or extremity pain  PSYCHIATRIC: No depression, anxiety, mood swings, or difficulty sleeping  HEME/LYMPH: No easy bruising, or bleeding gums  ALLERGY AND IMMUNOLOGIC: No hives or eczema  VASCULAR: no swelling, erythema      Physical Exam: frail elderly appearing 83 yo  gentleman lying in semi Smith's position, c/o generalized weakness    Head: normocephalic, atraumatic    Eyes: PERRLA, EOMI, no nystagmus, sclera anicteric    ENT: nasal discharge, uvula midline, no oropharyngeal erythema/exudate    Neck: supple, negative JVD, negative carotid bruits, no thyromegaly    Chest:     Cardiovascular: regular rate and rhythm, neg murmurs/rubs/gallops    Abdomen: soft, non distended, non tender, negative rebound/guarding, normal bowel sounds, neg hepatosplenomegaly    Extremities: WWP, neg cyanosis/clubbing/edema, negative calf tenderness to palpation, negative Shu's sign, right hand bruise    :     Neurologic Exam:    Alert and oriented to person, place, date/year, speech fluent w/o dysarthria, recent and remote memory intact, repetition intact, comprehension intact,     Cranial Nerves:     II:                       pupils equal, round and reactive to light, visual fields intact   III/ IV/VI:            extraocular movements intact, neg nystagmus, ptosis  V:                       facial sensation intact, V1-3 normal  VII:                     face symmetric, no droop, normal eye closure and smile  VIII:                    hearing intact to finger rub bilaterally  IX/ X:                 soft palate rise symmetrical  XI:                      head turning, shoulder shrug normal  XII:                     tongue midline    Motor Exam:    Upper Extremities:     RIght:   5/5   /intrinsics               5/5  wrist flexors/extensors               5/5  biceps/triceps               5/5  deltoid               negative drift    Left :     5/5  /intrinsics               5/5  wrist flexors/extensors               5/5 biceps/triceps               5/5 deltoid               negative drift    Lower Extremities:                 Right:     5/5  DF/PF/ evertors/ invertors                5/5  Quad/ hamstrings                4-/5  hip flexors/adductors/abductors                negative drift    Left:       5/5  DF/PF/ evertors/ invertors                5/5  Quad/ hamstrings                4-/5  hip flexors/adductors/abductors                    negative drift      Sensory:    intact to LT/PP in all UE/LE dermatomes    DTR:            = biceps/     triceps/     brachioradialis                      = patella/   medial hamstring/ankle                      neg clonus                      neg Babinski                      neg Hoffmans    Finger to Nose:  wnl    Heel to Shin:  wnl    Rapid Alternating movements:  wnl    Joint Position Sense:  intact    Romberg:  not tested    Tandem Walking:  not tested    Gait:  not tested        PM&R Impression:    1) deconditioned  2) no focal weakness  3) sepsis/ PNA        Recommendations:    1) Physical therapy focusing on therapeutic exercises, bed mobility/transfer out of bed evaluation, progressive ambulation with assistive devices prn, stair negotiation    2) Anticipated Disposition Plan/Recs: pending functional progress

## 2019-02-01 NOTE — PHYSICAL THERAPY INITIAL EVALUATION ADULT - GAIT DEVIATIONS NOTED, PT EVAL
decreased step length/decreased christine/decreased stride length/decreased weight-shifting ability/no shuffling though slowness with mobility/responses

## 2019-02-01 NOTE — PROGRESS NOTE ADULT - PROBLEM SELECTOR PLAN 7
Per  at PCP's office patient VL undetectable and CD4 > 200  - continue with Odefsey daily  - f/u with PCP to verify  - Patient would prefer that HIV status not be mentioned in front of visitors Per  at PCP's office, patient VL undetectable and CD4 > 200 in March 2018  - continue with Odefsey daily  - Patient would prefer that HIV status not be mentioned in front of visitors Per  at PCP's office, patient VL undetectable and CD4 269 in January 2019  - continue with Rylieefsey daily  - Patient would prefer that HIV status not be mentioned in front of visitors

## 2019-02-01 NOTE — DIETITIAN INITIAL EVALUATION ADULT. - ORAL INTAKE PTA
Pt called and was confused about whether he should be on the medicine that begins with an \"f\" for his prostate. [address] [place] [address] said that this was the Finasteride and that he just finished this RX which Dr. Kari Maradiaga had filled for a one year supply. After talking with Thelma Mcintosh NP about this, I called pt back and told him that he was switched to the terazosin and that he needed to refill this med. Pt also asked what meds he should stop before his ECHO stress test which is scheduled for Friday. He was told to call his PCP for this guidance.  I asked pt to call us back tomorrow morning for an update on this request. Christina Alcantar RN Patient claims he had following a "ketogenic diet for 6months" with noted 21lb weight loss based on stated UBW/poor

## 2019-02-01 NOTE — DIETITIAN INITIAL EVALUATION ADULT. - OTHER INFO
83 y/o male with HIV+ admitted with PNA/Sepsis and chronic weakness. Now noted to have severe constipation/impacted as per patient.No N/V or skin breakdown.Noted pain from constipation..Patient resides alone and does all own shopping and cooking independently,but admitted to recent difficulty with managing.

## 2019-02-01 NOTE — PHYSICAL THERAPY INITIAL EVALUATION ADULT - GENERAL OBSERVATIONS, REHAB EVAL
Rec'd pt supine in bed, in no acute distress, +bed alarm, cleared for PT and OOB activity by RN (crissy).

## 2019-02-01 NOTE — SWALLOW BEDSIDE ASSESSMENT ADULT - SLP GENERAL OBSERVATIONS
Pt was received sleeping in bed w breakfast tray on table. He woke easily but appeared lethargic. Ox3. He answered 3/3 simple and 3/3 complex yes/no questions. He stated that he needed to keep his eyes closed for this clinical exam so that he would be able to "hear better."

## 2019-02-01 NOTE — CONSULT NOTE ADULT - ASSESSMENT
83 yo M with PMH of HIV and chronic weakness being worked up for Parkinsons as an outpatient who comes in by EMS for acutely worsening weakness in the setting of current tx for PNA.  Patient meets severe sepsis criteria on admission with likely source of PNA.  Covering for HAP and gram negative PNA given recent IV abx, aspiration risk.  UA negative for UTI, less likely cellulitis or phlebitis at site of prior IV.    Problem/Plan - 1:  ·  Problem: Severe sepsis.  Plan: Patient met severe sepsis criteria on admission given WBC 12.1 with neutrophil predominance, HR >90, lactate 2.8. Likely source is PNA given recent dx as outpatient, productive cough, infiltrate on CXR.  Patient was on 10 day course of Bactrim IV and PO (unclear exact days of each).  Patient straight caths at home but UA negative.  R hand erythema but more consistent with hematoma from IVs, less likely phlebitis.  No discharge or warmth  - given recent IV antibiotics would cover for HAP, gram negative pneumonia.  Also risk of aspiration given weakness and workup for Parkinsons.    - s/p Ceftriaxone 1gm and Azithromycin 500mg in the ED  - Vanc 1000mg daily given Cr clearance 34, Zosyn 3.375g q6 given risk factors as above.   - repeat CXR  - s/p 3L NS in the ED  - lactate cleared from 2.8 to 1.2  - reperfusion exam performed  - f/u RVP, blood cxs  - f/u procalcitonin.     Problem/Plan - 2:  ·  Problem: Pneumonia, bacterial.  Plan: - Coverage for gram negative and HAP as above  - Unlikely to be PCP PNA given CD4 > 200 at last known testing, compliance with medications and unilateral infiltrate on CXR.     Problem/Plan - 3:  ·  Problem: R/O GIANCARLO (acute kidney injury).  Plan: Cr 1.33 on admission, no known baseline.  Patient with poor PO intake over last several days  - s/p 3L IVF in ED  - renally dose medications  - trend BMP.     Problem/Plan - 4:  ·  Problem: Azotemia.  Plan: BUN elevated to 84 on admission, no signs of active bleeding.  - likely in the setting of ketogenic diet with possible GIANCARLO  - continue to monitor.     Problem/Plan - 5:  ·  Problem: Weakness.  Plan: Patient with generalized weakness. Reports he has been undergoing workup for Parkinson's as an outpatient  - CT head negative for acute pathology or bleed  - Speech and swallow dysphagia screen  - Fall precautions.

## 2019-02-01 NOTE — PROGRESS NOTE ADULT - ASSESSMENT
83 yo M with PMH of HIV and chronic weakness being worked up for Parkinsons as an outpatient who comes in by EMS for acutely worsening weakness in the setting of current tx for PNA.  Patient meets severe sepsis criteria on admission with likely source of PNA.  Covering for HAP and gram negative PNA given recent IV abx, aspiration risk.  UA negative for UTI, less likely cellulitis or phlebitis at site of prior IV.

## 2019-02-01 NOTE — DIETITIAN INITIAL EVALUATION ADULT. - NS FNS WEIGHT CHANGE REASON
was on ketogenic diet. Question actually daily caloric intake.Patient presently in increased pain from constipation

## 2019-02-01 NOTE — PROGRESS NOTE ADULT - PROBLEM SELECTOR PLAN 5
Patient with generalized weakness. Reports he has been undergoing workup for Parkinson's as an outpatient  - CT head negative for acute pathology or bleed  - PT consult  - Speech and swallow dysphagia screen  - Fall precautions Patient with generalized weakness. Reports he has been undergoing workup for Parkinson's as an outpatient. Per outpt Neurology (Dr. Sohail Haynes - [phone]) suspects small vessel ischemic disease, specifically in the basal ganglia - possible mild extrapyramidal syndrome but no resting tremor appreciated on   - CT head negative for acute pathology or bleed  - PT consult  - Speech and swallow dysphagia screen  - Fall precautions

## 2019-02-01 NOTE — SWALLOW BEDSIDE ASSESSMENT ADULT - ORAL PHASE
~1 min to chew one small piece of a potato (~1"x1" in size). Severe residue of mech soft solids which pt did not attempt to spontaneously clear. Cleared w cued liquid washes.

## 2019-02-01 NOTE — PROGRESS NOTE ADULT - PROBLEM SELECTOR PLAN 10
1) PCP Contacted on Admission: N --> Dr. Jacobo [phone], voicemail left for PCP  2) Date of Contact with PCP:  3) PCP Contacted at Discharge: (Y/N)  4) Summary of Handoff Given to PCP:   5) Post-Discharge Appointment Date and Location: 1) PCP Contacted on Admission: N --> Dr. Jacobo [phone]  2) Date of Contact with PCP: 2/1/19  3) PCP Contacted at Discharge: (Y/N)  4) Summary of Handoff Given to PCP:   5) Post-Discharge Appointment Date and Location:

## 2019-02-01 NOTE — DIETITIAN INITIAL EVALUATION ADULT. - PROBLEM SELECTOR PLAN 10
1) PCP Contacted on Admission: N --> Dr. Jacobo [phone], voicemail left for PCP  2) Date of Contact with PCP:  3) PCP Contacted at Discharge: (Y/N)  4) Summary of Handoff Given to PCP:   5) Post-Discharge Appointment Date and Location:

## 2019-02-01 NOTE — PHYSICAL THERAPY INITIAL EVALUATION ADULT - PERTINENT HX OF CURRENT PROBLEM, REHAB EVAL
83 yo M  being worked up for Parkinsons as an outpatient who comes in by EMS for acutely worsening weakness in the setting of current tx for PNA.

## 2019-02-01 NOTE — DIETITIAN INITIAL EVALUATION ADULT. - NS AS NUTRI INTERV MEALS SNACK
General/healthful diet/SLP recommendations for pureed/Energy - modified diet/Protein - modified diet/Specific foods/beverages or groups/Texture-modified diet/Schedule of food/fluids

## 2019-02-01 NOTE — SWALLOW BEDSIDE ASSESSMENT ADULT - SWALLOW EVAL: DIAGNOSIS
Pt p/w mild-mod oropharyngeal dysphagia characterized by prolonged mastication and residue after chewable solids which he did not attempt to clear independently as well as possible reduced coordination resulting in cough only after mech soft. Given tehse observations, recommend a modified diet of puree w thin liquids. Pt was in agreement with this diet.

## 2019-02-01 NOTE — DIETITIAN INITIAL EVALUATION ADULT. - PHYSICAL APPEARANCE
debilitated/83 y/o male with severe sepsis and reported 21lb weight loss over 6months due to "ketogenic diet" question accuracy of weight loss.Suspected moderate malnutrition due to significant weight loss in elderly HIV+ male with ongoing poor po intake.Despite weight loss visual NFPE shows no wasting of temporal or limbs.81% of IBW.

## 2019-02-02 ENCOUNTER — TRANSCRIPTION ENCOUNTER (OUTPATIENT)
Age: 84
End: 2019-02-02

## 2019-02-02 VITALS
TEMPERATURE: 99 F | RESPIRATION RATE: 18 BRPM | SYSTOLIC BLOOD PRESSURE: 126 MMHG | DIASTOLIC BLOOD PRESSURE: 67 MMHG | HEART RATE: 74 BPM | OXYGEN SATURATION: 98 %

## 2019-02-02 DIAGNOSIS — G20 PARKINSON'S DISEASE: ICD-10-CM

## 2019-02-02 DIAGNOSIS — R53.81 OTHER MALAISE: ICD-10-CM

## 2019-02-02 LAB
ANION GAP SERPL CALC-SCNC: 10 MMOL/L — SIGNIFICANT CHANGE UP (ref 5–17)
BUN SERPL-MCNC: 24 MG/DL — HIGH (ref 7–23)
CALCIUM SERPL-MCNC: 8.9 MG/DL — SIGNIFICANT CHANGE UP (ref 8.4–10.5)
CHLORIDE SERPL-SCNC: 113 MMOL/L — HIGH (ref 96–108)
CO2 SERPL-SCNC: 23 MMOL/L — SIGNIFICANT CHANGE UP (ref 22–31)
CREAT SERPL-MCNC: 1.01 MG/DL — SIGNIFICANT CHANGE UP (ref 0.5–1.3)
GLUCOSE SERPL-MCNC: 103 MG/DL — HIGH (ref 70–99)
HCT VFR BLD CALC: 25.5 % — LOW (ref 39–50)
HGB BLD-MCNC: 8.5 G/DL — LOW (ref 13–17)
MAGNESIUM SERPL-MCNC: 2.2 MG/DL — SIGNIFICANT CHANGE UP (ref 1.6–2.6)
MCHC RBC-ENTMCNC: 32.4 PG — SIGNIFICANT CHANGE UP (ref 27–34)
MCHC RBC-ENTMCNC: 33.3 G/DL — SIGNIFICANT CHANGE UP (ref 32–36)
MCV RBC AUTO: 97.3 FL — SIGNIFICANT CHANGE UP (ref 80–100)
PHOSPHATE SERPL-MCNC: 3.1 MG/DL — SIGNIFICANT CHANGE UP (ref 2.5–4.5)
PLATELET # BLD AUTO: 240 K/UL — SIGNIFICANT CHANGE UP (ref 150–400)
POTASSIUM SERPL-MCNC: 3.7 MMOL/L — SIGNIFICANT CHANGE UP (ref 3.5–5.3)
POTASSIUM SERPL-SCNC: 3.7 MMOL/L — SIGNIFICANT CHANGE UP (ref 3.5–5.3)
RBC # BLD: 2.62 M/UL — LOW (ref 4.2–5.8)
RBC # FLD: 13.1 % — SIGNIFICANT CHANGE UP (ref 10.3–16.9)
SODIUM SERPL-SCNC: 146 MMOL/L — HIGH (ref 135–145)
WBC # BLD: 6.8 K/UL — SIGNIFICANT CHANGE UP (ref 3.8–10.5)
WBC # FLD AUTO: 6.8 K/UL — SIGNIFICANT CHANGE UP (ref 3.8–10.5)

## 2019-02-02 PROCEDURE — 99233 SBSQ HOSP IP/OBS HIGH 50: CPT

## 2019-02-02 RX ORDER — SENNA PLUS 8.6 MG/1
2 TABLET ORAL
Qty: 0 | Refills: 0 | COMMUNITY
Start: 2019-02-02

## 2019-02-02 RX ORDER — DOCUSATE SODIUM 100 MG
1 CAPSULE ORAL
Qty: 0 | Refills: 0 | COMMUNITY
Start: 2019-02-02

## 2019-02-02 RX ORDER — SIMETHICONE 80 MG/1
1 TABLET, CHEWABLE ORAL
Qty: 0 | Refills: 0 | COMMUNITY
Start: 2019-02-02

## 2019-02-02 RX ORDER — QUETIAPINE FUMARATE 200 MG/1
12.5 TABLET, FILM COATED ORAL AT BEDTIME
Qty: 0 | Refills: 0 | Status: COMPLETED | OUTPATIENT
Start: 2019-02-02 | End: 2019-02-03

## 2019-02-02 RX ORDER — QUETIAPINE FUMARATE 200 MG/1
25 TABLET, FILM COATED ORAL ONCE
Qty: 0 | Refills: 0 | Status: COMPLETED | OUTPATIENT
Start: 2019-02-02 | End: 2019-02-02

## 2019-02-02 RX ORDER — PIPERACILLIN AND TAZOBACTAM 4; .5 G/20ML; G/20ML
3.38 INJECTION, POWDER, LYOPHILIZED, FOR SOLUTION INTRAVENOUS
Qty: 0 | Refills: 0 | COMMUNITY
Start: 2019-02-02 | End: 2019-02-07

## 2019-02-02 RX ORDER — ATORVASTATIN CALCIUM 80 MG/1
1 TABLET, FILM COATED ORAL
Qty: 0 | Refills: 0 | COMMUNITY
Start: 2019-02-02

## 2019-02-02 RX ORDER — AZTREONAM 2 G
1 VIAL (EA) INJECTION
Qty: 0 | Refills: 0 | COMMUNITY

## 2019-02-02 RX ORDER — POTASSIUM CHLORIDE 20 MEQ
40 PACKET (EA) ORAL ONCE
Qty: 0 | Refills: 0 | Status: COMPLETED | OUTPATIENT
Start: 2019-02-02 | End: 2019-02-02

## 2019-02-02 RX ORDER — QUETIAPINE FUMARATE 200 MG/1
12.5 TABLET, FILM COATED ORAL ONCE
Qty: 0 | Refills: 0 | Status: COMPLETED | OUTPATIENT
Start: 2019-02-02 | End: 2019-02-02

## 2019-02-02 RX ORDER — POLYETHYLENE GLYCOL 3350 17 G/17G
17 POWDER, FOR SOLUTION ORAL
Qty: 0 | Refills: 0 | COMMUNITY
Start: 2019-02-02

## 2019-02-02 RX ORDER — PIPERACILLIN AND TAZOBACTAM 4; .5 G/20ML; G/20ML
3.38 INJECTION, POWDER, LYOPHILIZED, FOR SOLUTION INTRAVENOUS EVERY 6 HOURS
Qty: 0 | Refills: 0 | Status: DISCONTINUED | OUTPATIENT
Start: 2019-02-02 | End: 2019-02-03

## 2019-02-02 RX ADMIN — PIPERACILLIN AND TAZOBACTAM 200 GRAM(S): 4; .5 INJECTION, POWDER, LYOPHILIZED, FOR SOLUTION INTRAVENOUS at 17:35

## 2019-02-02 RX ADMIN — HEPARIN SODIUM 5000 UNIT(S): 5000 INJECTION INTRAVENOUS; SUBCUTANEOUS at 15:05

## 2019-02-02 RX ADMIN — Medication 40 MILLIEQUIVALENT(S): at 11:39

## 2019-02-02 RX ADMIN — PIPERACILLIN AND TAZOBACTAM 200 GRAM(S): 4; .5 INJECTION, POWDER, LYOPHILIZED, FOR SOLUTION INTRAVENOUS at 11:40

## 2019-02-02 RX ADMIN — PIPERACILLIN AND TAZOBACTAM 200 GRAM(S): 4; .5 INJECTION, POWDER, LYOPHILIZED, FOR SOLUTION INTRAVENOUS at 06:27

## 2019-02-02 RX ADMIN — QUETIAPINE FUMARATE 12.5 MILLIGRAM(S): 200 TABLET, FILM COATED ORAL at 15:18

## 2019-02-02 RX ADMIN — SENNA PLUS 2 TABLET(S): 8.6 TABLET ORAL at 22:42

## 2019-02-02 RX ADMIN — Medication 100 MILLIGRAM(S): at 11:40

## 2019-02-02 RX ADMIN — PIPERACILLIN AND TAZOBACTAM 200 GRAM(S): 4; .5 INJECTION, POWDER, LYOPHILIZED, FOR SOLUTION INTRAVENOUS at 00:00

## 2019-02-02 RX ADMIN — EMTRICITABINE, RILPIVIRINE HYDROCHLORIDE, AND TENOFOVIR DISOPROXIL FUMARATE 1 TABLET(S): 200; 25; 300 TABLET, FILM COATED ORAL at 11:40

## 2019-02-02 RX ADMIN — HEPARIN SODIUM 5000 UNIT(S): 5000 INJECTION INTRAVENOUS; SUBCUTANEOUS at 22:42

## 2019-02-02 RX ADMIN — ATORVASTATIN CALCIUM 10 MILLIGRAM(S): 80 TABLET, FILM COATED ORAL at 22:42

## 2019-02-02 RX ADMIN — QUETIAPINE FUMARATE 25 MILLIGRAM(S): 200 TABLET, FILM COATED ORAL at 02:08

## 2019-02-02 RX ADMIN — POLYETHYLENE GLYCOL 3350 17 GRAM(S): 17 POWDER, FOR SOLUTION ORAL at 11:40

## 2019-02-02 RX ADMIN — PIPERACILLIN AND TAZOBACTAM 200 GRAM(S): 4; .5 INJECTION, POWDER, LYOPHILIZED, FOR SOLUTION INTRAVENOUS at 23:44

## 2019-02-02 NOTE — PROGRESS NOTE ADULT - PROBLEM SELECTOR PLAN 7
Per  at PCP's office, patient VL undetectable and CD4 269 in January 2019  - continue with Rylieefsey daily  - Patient would prefer that HIV status not be mentioned in front of visitors

## 2019-02-02 NOTE — DISCHARGE NOTE ADULT - PLAN OF CARE
Medical management You were admitted to the hospital with pneumonia, an infection of the lungs. Your infection was monitored throughout your stay with bloodwork and you were given antibiotics to help fight the infection. If you are discharged with antibiotics, please continue them for the full duration of the prescribed course. If you experience fever, shortness of breath, chest pain, or weakness, please return to the emergency department. Medical management and monitoring You were noted to have a temporary insult to your kidney function either at the time that you arrived at the hospital or during your stay here. We have monitored your kidney function with blood work during your time here and you are at a level that no longer requires continued hospital level care, but we do recommend that you follow up to continually have your kidney function checked. You can follow up with your primary care doctor, or, if recommended in the discharge paperwork, you should follow up with a Kidney specialist called a Nephrologist. Monitoring Anemia is a low number of red blood cells or a low amount of hemoglobin in your red blood cells. Hemoglobin is a protein that helps carry oxygen throughout your body. Red blood cells use iron to create hemoglobin. Anemia may develop if your body does not have enough iron. It may also develop if your body does not make enough red blood cells or they die faster than your body can make them. Managment You have a history of urinary retention, for which you insert a catheter every 6 hours. While you were admitted, we continued your home regimen. You came to the hospital with weakness. This was likely You were admitted to the hospital with pneumonia, an infection of the lungs. Your infection was monitored throughout your stay with bloodwork and you were given antibiotics to help fight the infection. If you are discharged with antibiotics, please continue them for the full duration of the prescribed course. If you experience fever, shortness of breath, chest pain, or weakness, please return to the emergency department. You were discharged on ceftriaxone, an IV antibiotic that you will continue to receive in rehab for a total of 7 days. Management You came to the hospital with weakness. This was likely due to the infection in your lung that caused a systemic reaction. Your weakness improved to baseline with antibiotic treatment. Please follow up with your neurologist within 1-2 weeks. While hospitalized, you mentioned that you were being worked up for Parkinson's disease. Some symptoms you showed are consistent with this. However, you are not currently on any parkinson's medication. Please follow up with both your primary care doctor and neurologist for further monitoring and management. You were admitted to the hospital with pneumonia, an infection of the lungs. Your infection was monitored throughout your stay with bloodwork and you were given antibiotics to help fight the infection. If you are discharged with antibiotics, please continue them for the full duration of the prescribed course. If you experience fever, shortness of breath, chest pain, or weakness, please return to the emergency department. You were discharged on Zosyn an IV antibiotic that you will continue to receive in rehab for a total of 7 days.

## 2019-02-02 NOTE — PROGRESS NOTE ADULT - PROBLEM SELECTOR PLAN 5
Patient with generalized weakness. Reports he has been undergoing workup for Parkinson's as an outpatient. Per outpt Neurology (Dr. Sohail Haynes - [phone]) suspects small vessel ischemic disease, specifically in the basal ganglia - possible mild extrapyramidal syndrome but no resting tremor appreciated on   - CT head negative for acute pathology or bleed  - PT consult  - Speech and swallow dysphagia screen  - Fall precautions

## 2019-02-02 NOTE — DISCHARGE NOTE ADULT - CARE PROVIDER_API CALL
Mauricio Silvestre  [address] [facility], [address] [place] [id number]  Phone: [phone]  Fax: (   )    -

## 2019-02-02 NOTE — DISCHARGE NOTE ADULT - HOSPITAL COURSE
85 yo M with PMH of HIV and chronic weakness being worked up for Parkinsons as an outpatient who comes in by EMS for acutely worsening weakness in the setting of current tx for PNA.  Patient met severe sepsis criteria on admission with likely source of PNA. He was given Zosyn for coverage. At this time, HD stable for discharge to Rehab.   He will finish course of Zosyn at rehab.

## 2019-02-02 NOTE — DISCHARGE NOTE ADULT - PATIENT PORTAL LINK FT
You can access the [facility] Patient Portal, offered by [facility], by registering with the following website: [email or web address]

## 2019-02-02 NOTE — PROGRESS NOTE ADULT - SUBJECTIVE AND OBJECTIVE BOX
OVERNIGHT EVENTS: Pt with 2 episodes of agitation/disorientation, trying to climb out of bed "to go to the hospital." Required seroquel 12.5mg x1.     SUBJECTIVE: Pt seen and examined at bedside. Denies fever, chills, headache, chest pain, shortness of breath, abdominal pain, n/v/d/c, numbness, weakness, tingling.     Vital Signs Last 12 Hrs  T(F): 98.2 (19 @ 05:38), Max: 98.2 (19 @ 05:38)  HR: 69 (19 @ 05:38) (69 - 91)  BP: 138/80 (19 @ 05:38) (128/74 - 138/80)  BP(mean): --  RR: 18 (19 @ 05:38) (18 - 18)  SpO2: 95% (19 @ 05:38) (95% - 96%)  I&O's Summary    2019 07:01  -  2019 07:00  --------------------------------------------------------  IN: 200 mL / OUT: 3250 mL / NET: -3050 mL    PHYSICAL EXAM:  Constitutional: Male, NAD, comfortable in bed.  HEENT: NC/AT, PERRLA, EOMI, no conjunctival pallor or scleral icterus, MMM  Neck: Supple, no JVD  Respiratory: Normal rate, rhythm, depth, effort. Decreased breath sounds on R side. No w/r/r. No egophony.    Cardiovascular: RRR, normal S1 and S2, no m/r/g.   Gastrointestinal: +BS, soft NTND, no guarding or rebound tenderness, no palpable masses   Extremities: wwp; no cyanosis, clubbing or edema. Ecchymosis on L hand.   Vascular: Pulses equal and strong throughout.   Neurological: AAOx3, no CN deficits, strength and sensation intact throughout.   Skin: No gross skin abnormalities or rashes      LABS:                        8.5    6.8   )-----------( 240      ( 2019 06:28 )             25.5     02-02    146<H>  |  113<H>  |  24<H>  ----------------------------<  103<H>  3.7   |  23  |  1.01    Ca    8.9      2019 06:28  Phos  3.1     02-02  Mg     2.2     02-02    TPro  6.3<L>  /  Alb  3.3<L>  /  TBili  0.4  /  DBili  x   /  AST  24  /  ALT  14  /  AlkPhos  54        Urinalysis Basic - ( 2019 12:22 )    Color: Yellow / Appearance: Clear / S.025 / pH: x  Gluc: x / Ketone: 15 mg/dL  / Bili: NEGATIVE / Urobili: 0.2 E.U./dL   Blood: x / Protein: NEGATIVE mg/dL / Nitrite: NEGATIVE   Leuk Esterase: NEGATIVE / RBC: x / WBC x   Sq Epi: x / Non Sq Epi: x / Bacteria: x    RADIOLOGY & ADDITIONAL TESTS:    MEDICATIONS  (STANDING):  atorvastatin 10 milliGRAM(s) Oral at bedtime  docusate sodium 100 milliGRAM(s) Oral daily  emtricitabine 200 mG/rilpivirine 25 mG/tenofovir alafenamide 25 mG (ODEFSEY) Tablet 1 Tablet(s) Oral daily  ergocalciferol 43039 Unit(s) Oral every week  heparin  Injectable 5000 Unit(s) SubCutaneous every 8 hours  piperacillin/tazobactam IVPB. 3.375 Gram(s) IV Intermittent every 6 hours  polyethylene glycol 3350 17 Gram(s) Oral daily  senna 2 Tablet(s) Oral at bedtime    MEDICATIONS  (PRN):  simethicone 80 milliGRAM(s) Chew three times a day PRN Gas

## 2019-02-02 NOTE — DISCHARGE NOTE ADULT - SECONDARY DIAGNOSIS.
GIANCARLO (acute kidney injury) Anemia, unspecified type Urinary retention Weakness Parkinsonism, unspecified Parkinsonism type

## 2019-02-02 NOTE — PROVIDER CONTACT NOTE (OTHER) - SITUATION
Patient climbing out of the bed frequently. Says he is going to the hospital when asked where he is going.

## 2019-02-02 NOTE — DISCHARGE NOTE ADULT - MEDICATION SUMMARY - MEDICATIONS TO TAKE
I will START or STAY ON the medications listed below when I get home from the hospital:    atorvastatin 10 mg oral tablet  -- 1 tab(s) by mouth once a day (at bedtime)  -- Indication: For Hyperlipidemia    Odefsey oral tablet  -- 1 tab(s) by mouth once a day  -- Indication: For Antiviral    docusate sodium 100 mg oral capsule  -- 1 cap(s) by mouth once a day  -- Indication: For Constipation    polyethylene glycol 3350 oral powder for reconstitution  -- 17 gram(s) by mouth once a day  -- Indication: For Constipation    senna oral tablet  -- 2 tab(s) by mouth once a day (at bedtime)  -- Indication: For Constipation    simethicone 80 mg oral tablet, chewable  -- 1 tab(s) by mouth 3 times a day, As needed, Gas  -- Indication: For Gas    piperacillin-tazobactam 2 g-0.25 g intravenous injection  -- 3.375 gram(s) intravenous every 6 hours  -- Indication: For Pneumonia, bacterial    cholecalciferol 50,000 intl units oral capsule  -- 1 cap(s) by mouth once a week  -- Indication: For Vitamin

## 2019-02-02 NOTE — PROGRESS NOTE ADULT - PROBLEM SELECTOR PLAN 2
- Coverage for gram negative and HAP as above  - Per collateral, sputum cx + for few gram positive cocci in pairs, chains, and clusters, rare gram negative rods, rare yeast  - Unlikely to be PCP PNA given CD4 > 200 at last known testing, compliance with medications and ? unilateral infiltrate on CXR

## 2019-02-02 NOTE — DISCHARGE NOTE ADULT - CARE PLAN
Principal Discharge DX:	Pneumonia, bacterial  Goal:	Medical management  Assessment and plan of treatment:	You were admitted to the hospital with pneumonia, an infection of the lungs. Your infection was monitored throughout your stay with bloodwork and you were given antibiotics to help fight the infection. If you are discharged with antibiotics, please continue them for the full duration of the prescribed course. If you experience fever, shortness of breath, chest pain, or weakness, please return to the emergency department.  Secondary Diagnosis:	GIANCARLO (acute kidney injury)  Goal:	Medical management and monitoring  Assessment and plan of treatment:	You were noted to have a temporary insult to your kidney function either at the time that you arrived at the hospital or during your stay here. We have monitored your kidney function with blood work during your time here and you are at a level that no longer requires continued hospital level care, but we do recommend that you follow up to continually have your kidney function checked. You can follow up with your primary care doctor, or, if recommended in the discharge paperwork, you should follow up with a Kidney specialist called a Nephrologist.  Secondary Diagnosis:	Anemia, unspecified type  Goal:	Monitoring  Assessment and plan of treatment:	Anemia is a low number of red blood cells or a low amount of hemoglobin in your red blood cells. Hemoglobin is a protein that helps carry oxygen throughout your body. Red blood cells use iron to create hemoglobin. Anemia may develop if your body does not have enough iron. It may also develop if your body does not make enough red blood cells or they die faster than your body can make them.  Secondary Diagnosis:	Urinary retention  Goal:	Managment  Assessment and plan of treatment:	You have a history of urinary retention, for which you insert a catheter every 6 hours. While you were admitted, we continued your home regimen.  Secondary Diagnosis:	Weakness  Goal:	Monitoring  Assessment and plan of treatment:	You came to the hospital with weakness. This was likely Principal Discharge DX:	Pneumonia, bacterial  Goal:	Medical management  Assessment and plan of treatment:	You were admitted to the hospital with pneumonia, an infection of the lungs. Your infection was monitored throughout your stay with bloodwork and you were given antibiotics to help fight the infection. If you are discharged with antibiotics, please continue them for the full duration of the prescribed course. If you experience fever, shortness of breath, chest pain, or weakness, please return to the emergency department. You were discharged on ceftriaxone, an IV antibiotic that you will continue to receive in rehab for a total of 7 days.  Secondary Diagnosis:	GIANCARLO (acute kidney injury)  Goal:	Medical management and monitoring  Assessment and plan of treatment:	You were noted to have a temporary insult to your kidney function either at the time that you arrived at the hospital or during your stay here. We have monitored your kidney function with blood work during your time here and you are at a level that no longer requires continued hospital level care, but we do recommend that you follow up to continually have your kidney function checked. You can follow up with your primary care doctor, or, if recommended in the discharge paperwork, you should follow up with a Kidney specialist called a Nephrologist.  Secondary Diagnosis:	Anemia, unspecified type  Goal:	Monitoring  Assessment and plan of treatment:	Anemia is a low number of red blood cells or a low amount of hemoglobin in your red blood cells. Hemoglobin is a protein that helps carry oxygen throughout your body. Red blood cells use iron to create hemoglobin. Anemia may develop if your body does not have enough iron. It may also develop if your body does not make enough red blood cells or they die faster than your body can make them.  Secondary Diagnosis:	Urinary retention  Goal:	Management  Assessment and plan of treatment:	You have a history of urinary retention, for which you insert a catheter every 6 hours. While you were admitted, we continued your home regimen.  Secondary Diagnosis:	Weakness  Goal:	Monitoring  Assessment and plan of treatment:	You came to the hospital with weakness. This was likely due to the infection in your lung that caused a systemic reaction. Your weakness improved to baseline with antibiotic treatment. Please follow up with your neurologist within 1-2 weeks.  Secondary Diagnosis:	Parkinsonism, unspecified Parkinsonism type  Goal:	Monitoring  Assessment and plan of treatment:	While hospitalized, you mentioned that you were being worked up for Parkinson's disease. Some symptoms you showed are consistent with this. However, you are not currently on any parkinson's medication. Please follow up with both your primary care doctor and neurologist for further monitoring and management. Principal Discharge DX:	Pneumonia, bacterial  Goal:	Medical management  Assessment and plan of treatment:	You were admitted to the hospital with pneumonia, an infection of the lungs. Your infection was monitored throughout your stay with bloodwork and you were given antibiotics to help fight the infection. If you are discharged with antibiotics, please continue them for the full duration of the prescribed course. If you experience fever, shortness of breath, chest pain, or weakness, please return to the emergency department. You were discharged on Zosyn an IV antibiotic that you will continue to receive in rehab for a total of 7 days.  Secondary Diagnosis:	GIANCARLO (acute kidney injury)  Goal:	Medical management and monitoring  Assessment and plan of treatment:	You were noted to have a temporary insult to your kidney function either at the time that you arrived at the hospital or during your stay here. We have monitored your kidney function with blood work during your time here and you are at a level that no longer requires continued hospital level care, but we do recommend that you follow up to continually have your kidney function checked. You can follow up with your primary care doctor, or, if recommended in the discharge paperwork, you should follow up with a Kidney specialist called a Nephrologist.  Secondary Diagnosis:	Anemia, unspecified type  Goal:	Monitoring  Assessment and plan of treatment:	Anemia is a low number of red blood cells or a low amount of hemoglobin in your red blood cells. Hemoglobin is a protein that helps carry oxygen throughout your body. Red blood cells use iron to create hemoglobin. Anemia may develop if your body does not have enough iron. It may also develop if your body does not make enough red blood cells or they die faster than your body can make them.  Secondary Diagnosis:	Urinary retention  Goal:	Management  Assessment and plan of treatment:	You have a history of urinary retention, for which you insert a catheter every 6 hours. While you were admitted, we continued your home regimen.  Secondary Diagnosis:	Weakness  Goal:	Monitoring  Assessment and plan of treatment:	You came to the hospital with weakness. This was likely due to the infection in your lung that caused a systemic reaction. Your weakness improved to baseline with antibiotic treatment. Please follow up with your neurologist within 1-2 weeks.  Secondary Diagnosis:	Parkinsonism, unspecified Parkinsonism type  Goal:	Monitoring  Assessment and plan of treatment:	While hospitalized, you mentioned that you were being worked up for Parkinson's disease. Some symptoms you showed are consistent with this. However, you are not currently on any parkinson's medication. Please follow up with both your primary care doctor and neurologist for further monitoring and management.

## 2019-02-02 NOTE — PROGRESS NOTE ADULT - PROBLEM SELECTOR PLAN 1
Patient met severe sepsis criteria on admission given WBC 12.1 with neutrophil predominance, HR >90, lactate 2.8. Likely source is PNA given recent dx as outpatient, productive cough, infiltrate on CXR.  Patient was on a 10 day course of ceftriaxone 1g IV QD as outpt for PNA, along with doxycycline BID for days he could not make it into the office. Pt was also being treated with PO bactrim for a concomitant UTI. Patient straight caths at home but UA negative.  L hand erythema but more consistent with hematoma from IVs, less likely phlebitis.  No discharge or warmth. Lactate 2.8 on admission, cleared s/p 3L NS in ED.  - procalcitonin elevated to 0.17  - given recent IV antibiotics would cover for HAP, gram negative pneumonia.  Also risk of aspiration given weakness and workup for Parkinsons.    - s/p Ceftriaxone 1gm and Azithromycin 500mg in the ED  - Started on vanc and zosyn. Vanc discontinued  - c/w Zosyn 3.375g q6 given risk factors as above  - repeat CXR  - reperfusion exam performed  - RVP negative, blood cx and urine cx NGTD

## 2019-02-02 NOTE — PROVIDER CONTACT NOTE (OTHER) - ASSESSMENT
Patient continues to climb out of the bed without requesting assistance. Forgetful but alert and oriented to time, place  and person. Toileting offered but was refused.

## 2019-02-02 NOTE — PROVIDER CONTACT NOTE (OTHER) - ASSESSMENT
Patient climbing out of the bed frequently.  Gait unsteady. Says he is going to the hospital when asked where he is going. Patient oriented times 4 but forgetful.

## 2019-02-02 NOTE — DISCHARGE NOTE ADULT - ADDITIONAL INSTRUCTIONS
Please call Dr. Silvestre's office on Monday to schedule a follow up visit ([phone])    Please call Dr. Haynes (neurology) on Monday to schedule a follow up visit [phone] Continue on Zosyn 3.375 mg q6 hours until 2/7/19. Thank you    Please call Dr. Silvestre's office on Monday to schedule a follow up visit ([phone])    Please call Dr. Haynes (neurology) on Monday to schedule a follow up visit [phone]

## 2019-02-02 NOTE — PROGRESS NOTE ADULT - PROBLEM SELECTOR PLAN 3
Cr 1.33 on admission, outpatient records with Cr 1.22 on 1/22.  Patient with poor PO intake over last several days  - s/p 3L IVF in ED  - renally dose medications  - trend BMP

## 2019-02-02 NOTE — DISCHARGE NOTE ADULT - PROVIDER TOKENS
FREE:[LAST:[Nirmala],FIRST:[Mauricio],PHONE:[phone]],FAX:[(   )    -],ADDRESS:[[address] [place] [address]]]

## 2019-02-02 NOTE — PROGRESS NOTE ADULT - PROBLEM SELECTOR PLAN 10
1) PCP Contacted on Admission: N --> Dr. Jacobo [phone]  2) Date of Contact with PCP: 2/1/19  3) PCP Contacted at Discharge: (Y/N)  4) Summary of Handoff Given to PCP:   5) Post-Discharge Appointment Date and Location:

## 2019-02-02 NOTE — PROGRESS NOTE ADULT - SUBJECTIVE AND OBJECTIVE BOX
Patient is a 84y old  Male who presents with a chief complaint of Severe sepsis (02 Feb 2019 12:55)      INTERVAL HPI/OVERNIGHT EVENTS:    Pt. seen and examined at 11:45AM  Pt.'s friend at bedside  cough improving  Pt. c/o debility, agreeable to KELLY placement  Denies F/C, SOB, CP    Review of Systems: 12 point review of systems otherwise negative    MEDICATIONS  (STANDING):  atorvastatin 10 milliGRAM(s) Oral at bedtime  docusate sodium 100 milliGRAM(s) Oral daily  emtricitabine 200 mG/rilpivirine 25 mG/tenofovir alafenamide 25 mG (ODEFSEY) Tablet 1 Tablet(s) Oral daily  ergocalciferol 48216 Unit(s) Oral every week  heparin  Injectable 5000 Unit(s) SubCutaneous every 8 hours  piperacillin/tazobactam IVPB. 3.375 Gram(s) IV Intermittent every 6 hours  polyethylene glycol 3350 17 Gram(s) Oral daily  senna 2 Tablet(s) Oral at bedtime    MEDICATIONS  (PRN):  simethicone 80 milliGRAM(s) Chew three times a day PRN Gas      Allergies    Cipro (Unknown)    Intolerances          Vital Signs Last 24 Hrs  T(C): 37.1 (02 Feb 2019 14:06), Max: 37.1 (02 Feb 2019 14:06)  T(F): 98.7 (02 Feb 2019 14:06), Max: 98.7 (02 Feb 2019 14:06)  HR: 72 (02 Feb 2019 14:06) (69 - 91)  BP: 130/80 (02 Feb 2019 14:06) (128/74 - 138/80)  BP(mean): --  RR: 18 (02 Feb 2019 14:06) (18 - 18)  SpO2: 95% (02 Feb 2019 14:06) (95% - 96%)  CAPILLARY BLOOD GLUCOSE          02-01 @ 07:01  -  02-02 @ 07:00  --------------------------------------------------------  IN: 200 mL / OUT: 3250 mL / NET: -3050 mL        Physical Exam:  (at 11:45AM)  Daily     Daily   General:  non-toxic appearing in NAD  HEENT:  MMM  Lungs:  CTA B/L anteriorly, normal WOB on RA  Abdomen:  soft NT ND  Extremities:  no edema B/L LE  Skin:  WWP, L hand/wrist ecchymosis   :  No Stevens  Neuro:  AAOx3, +bradykinesia    LABS:                        8.5    6.8   )-----------( 240      ( 02 Feb 2019 06:28 )             25.5     02-02    146<H>  |  113<H>  |  24<H>  ----------------------------<  103<H>  3.7   |  23  |  1.01    Ca    8.9      02 Feb 2019 06:28  Phos  3.1     02-02  Mg     2.2     02-02              RADIOLOGY & ADDITIONAL TESTS:    ---------------------------------------------------------------------------  I personally reviewed: [  ]EKG   [  ]CXR    [  ] CT    [  ]Other  ---------------------------------------------------------------------------  PLEASE CHECK WHEN PRESENT:     [  ]Heart Failure     [  ] Acute     [  ] Acute on Chronic     [  ] Chronic  -------------------------------------------------------------------     [  ]Diastolic [HFpEF]     [  ]Systolic [HFrEF]     [  ]Combined [HFpEF & HFrEF]     [  ]Other:  -------------------------------------------------------------------  [  ]GIANCARLO     [  ]ATN     [  ]Reneal Medullary Necrosis     [  ]Renal Cortical Necrosis     [  ]Other Pathological Lesions:    [  ]CKD 1  [  ]CKD 2  [  ]CKD 3  [  ]CKD 4  [  ]CKD 5  [  ]Other  -------------------------------------------------------------------  [  ]Other/Unspecified:    --------------------------------------------------------------------    Abdominal Nutritional Status  [  ]Malnutrition: See Nutrition Note  [  ]Cachexia  [  ]Other:   [  ]Supplement Ordered:  [  ]Morbid Obesity (BMI >=40]

## 2019-02-03 PROCEDURE — 99238 HOSP IP/OBS DSCHRG MGMT 30/<: CPT

## 2019-02-03 RX ADMIN — PIPERACILLIN AND TAZOBACTAM 200 GRAM(S): 4; .5 INJECTION, POWDER, LYOPHILIZED, FOR SOLUTION INTRAVENOUS at 05:28

## 2019-02-03 NOTE — PROGRESS NOTE ADULT - SUBJECTIVE AND OBJECTIVE BOX
Patient is a 84y old  Male who presents with a chief complaint of Severe sepsis (02 Feb 2019 15:04)      INTERVAL HPI/OVERNIGHT EVENTS:    Pt. seen and examined at 8:30AM  Pt. has no new complaints, cough resolving  Agreeable to KELLY placement  Episode of agitation last night, resolved w/ Seroquel    Review of Systems: 12 point review of systems otherwise negative    MEDICATIONS  (STANDING):  atorvastatin 10 milliGRAM(s) Oral at bedtime  docusate sodium 100 milliGRAM(s) Oral daily  emtricitabine 200 mG/rilpivirine 25 mG/tenofovir alafenamide 25 mG (ODEFSEY) Tablet 1 Tablet(s) Oral daily  ergocalciferol 97031 Unit(s) Oral every week  heparin  Injectable 5000 Unit(s) SubCutaneous every 8 hours  piperacillin/tazobactam IVPB. 3.375 Gram(s) IV Intermittent every 6 hours  polyethylene glycol 3350 17 Gram(s) Oral daily  senna 2 Tablet(s) Oral at bedtime    MEDICATIONS  (PRN):  simethicone 80 milliGRAM(s) Chew three times a day PRN Gas      Allergies    Cipro (Unknown)    Intolerances          Vital Signs Last 24 Hrs  T(C): 37.1 (02 Feb 2019 22:40), Max: 37.1 (02 Feb 2019 14:06)  T(F): 98.8 (02 Feb 2019 22:40), Max: 98.8 (02 Feb 2019 22:40)  HR: 74 (02 Feb 2019 22:40) (72 - 74)  BP: 126/67 (02 Feb 2019 22:40) (126/67 - 130/80)  BP(mean): 87 (02 Feb 2019 22:40) (87 - 87)  RR: 18 (02 Feb 2019 22:40) (18 - 18)  SpO2: 98% (02 Feb 2019 22:40) (95% - 98%)  CAPILLARY BLOOD GLUCOSE          02-02 @ 07:01  -  02-03 @ 07:00  --------------------------------------------------------  IN: 0 mL / OUT: 250 mL / NET: -250 mL        Physical Exam:  (at 8:30AM)  Daily     Daily   General:  non-toxic appearing in NAD, calm and cooperative  HEENT:  MMM  Abdomen:  soft NT ND  Skin:  WWP, L hand/wrist ecchymosis   :  No Stevens  Neuro:  AAOx3, +bradykinesia    LABS:                        8.5    6.8   )-----------( 240      ( 02 Feb 2019 06:28 )             25.5     02-02    146<H>  |  113<H>  |  24<H>  ----------------------------<  103<H>  3.7   |  23  |  1.01    Ca    8.9      02 Feb 2019 06:28  Phos  3.1     02-02  Mg     2.2     02-02              RADIOLOGY & ADDITIONAL TESTS:    ---------------------------------------------------------------------------  I personally reviewed: [  ]EKG   [  ]CXR    [  ] CT    [  ]Other  ---------------------------------------------------------------------------  PLEASE CHECK WHEN PRESENT:     [  ]Heart Failure     [  ] Acute     [  ] Acute on Chronic     [  ] Chronic  -------------------------------------------------------------------     [  ]Diastolic [HFpEF]     [  ]Systolic [HFrEF]     [  ]Combined [HFpEF & HFrEF]     [  ]Other:  -------------------------------------------------------------------  [  ]GIANCARLO     [  ]ATN     [  ]Reneal Medullary Necrosis     [  ]Renal Cortical Necrosis     [  ]Other Pathological Lesions:    [  ]CKD 1  [  ]CKD 2  [  ]CKD 3  [  ]CKD 4  [  ]CKD 5  [  ]Other  -------------------------------------------------------------------  [  ]Other/Unspecified:    --------------------------------------------------------------------    Abdominal Nutritional Status  [  ]Malnutrition: See Nutrition Note  [  ]Cachexia  [  ]Other:   [  ]Supplement Ordered:  [  ]Morbid Obesity (BMI >=40]

## 2019-02-05 ENCOUNTER — APPOINTMENT (OUTPATIENT)
Age: 84
End: 2019-02-05

## 2019-02-05 DIAGNOSIS — E78.5 HYPERLIPIDEMIA, UNSPECIFIED: ICD-10-CM

## 2019-02-05 DIAGNOSIS — B20 HUMAN IMMUNODEFICIENCY VIRUS [HIV] DISEASE: ICD-10-CM

## 2019-02-05 DIAGNOSIS — J18.9 PNEUMONIA, UNSPECIFIED ORGANISM: ICD-10-CM

## 2019-02-05 PROBLEM — Z00.00 ENCOUNTER FOR PREVENTIVE HEALTH EXAMINATION: Status: ACTIVE | Noted: 2019-02-05

## 2019-02-06 DIAGNOSIS — E43 UNSPECIFIED SEVERE PROTEIN-CALORIE MALNUTRITION: ICD-10-CM
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
## 2019-02-06 DIAGNOSIS — R65.20 SEVERE SEPSIS WITHOUT SEPTIC SHOCK: ICD-10-CM

## 2019-02-06 DIAGNOSIS — J15.6 PNEUMONIA DUE TO OTHER GRAM-NEGATIVE BACTERIA: ICD-10-CM

## 2019-02-06 DIAGNOSIS — G20 PARKINSON'S DISEASE: ICD-10-CM

## 2019-02-06 DIAGNOSIS — Z21 ASYMPTOMATIC HUMAN IMMUNODEFICIENCY VIRUS [HIV] INFECTION STATUS: ICD-10-CM

## 2019-02-06 DIAGNOSIS — D64.9 ANEMIA, UNSPECIFIED: ICD-10-CM

## 2019-02-06 DIAGNOSIS — R33.9 RETENTION OF URINE, UNSPECIFIED: ICD-10-CM

## 2019-02-06 DIAGNOSIS — N17.9 ACUTE KIDNEY FAILURE, UNSPECIFIED: ICD-10-CM

## 2019-02-06 DIAGNOSIS — A41.9 SEPSIS, UNSPECIFIED ORGANISM: ICD-10-CM

## 2019-02-06 DIAGNOSIS — A41.50 GRAM-NEGATIVE SEPSIS, UNSPECIFIED: ICD-10-CM

## 2019-02-11 PROBLEM — J18.9 COMMUNITY ACQUIRED PNEUMONIA: Status: ACTIVE | Noted: 2019-02-11

## 2019-02-11 PROBLEM — E78.5 HYPERLIPIDEMIA, UNSPECIFIED HYPERLIPIDEMIA TYPE: Status: ACTIVE | Noted: 2019-02-11

## 2019-02-11 PROBLEM — B20 HIV (HUMAN IMMUNODEFICIENCY VIRUS INFECTION): Status: ACTIVE | Noted: 2019-02-11

## 2019-02-11 RX ORDER — ATORVASTATIN CALCIUM 10 MG/1
10 TABLET, FILM COATED ORAL DAILY
Qty: 90 | Refills: 3 | Status: ACTIVE | COMMUNITY
Start: 2019-02-11

## 2019-02-26 PROCEDURE — 83550 IRON BINDING TEST: CPT

## 2019-02-26 PROCEDURE — 36415 COLL VENOUS BLD VENIPUNCTURE: CPT

## 2019-02-26 PROCEDURE — 87633 RESP VIRUS 12-25 TARGETS: CPT

## 2019-02-26 PROCEDURE — 80053 COMPREHEN METABOLIC PANEL: CPT

## 2019-02-26 PROCEDURE — 87631 RESP VIRUS 3-5 TARGETS: CPT

## 2019-02-26 PROCEDURE — 83540 ASSAY OF IRON: CPT

## 2019-02-26 PROCEDURE — 93005 ELECTROCARDIOGRAM TRACING: CPT

## 2019-02-26 PROCEDURE — 87798 DETECT AGENT NOS DNA AMP: CPT

## 2019-02-26 PROCEDURE — 84145 PROCALCITONIN (PCT): CPT

## 2019-02-26 PROCEDURE — 96365 THER/PROPH/DIAG IV INF INIT: CPT

## 2019-02-26 PROCEDURE — 84484 ASSAY OF TROPONIN QUANT: CPT

## 2019-02-26 PROCEDURE — 71045 X-RAY EXAM CHEST 1 VIEW: CPT

## 2019-02-26 PROCEDURE — 80307 DRUG TEST PRSMV CHEM ANLYZR: CPT

## 2019-02-26 PROCEDURE — 87040 BLOOD CULTURE FOR BACTERIA: CPT

## 2019-02-26 PROCEDURE — 85027 COMPLETE CBC AUTOMATED: CPT

## 2019-02-26 PROCEDURE — 82962 GLUCOSE BLOOD TEST: CPT

## 2019-02-26 PROCEDURE — 87581 M.PNEUMON DNA AMP PROBE: CPT

## 2019-02-26 PROCEDURE — 83735 ASSAY OF MAGNESIUM: CPT

## 2019-02-26 PROCEDURE — 84100 ASSAY OF PHOSPHORUS: CPT

## 2019-02-26 PROCEDURE — 81003 URINALYSIS AUTO W/O SCOPE: CPT

## 2019-02-26 PROCEDURE — 84466 ASSAY OF TRANSFERRIN: CPT

## 2019-02-26 PROCEDURE — 70450 CT HEAD/BRAIN W/O DYE: CPT

## 2019-02-26 PROCEDURE — 80048 BASIC METABOLIC PNL TOTAL CA: CPT

## 2019-02-26 PROCEDURE — 87486 CHLMYD PNEUM DNA AMP PROBE: CPT

## 2019-02-26 PROCEDURE — 83605 ASSAY OF LACTIC ACID: CPT

## 2019-02-26 PROCEDURE — 92526 ORAL FUNCTION THERAPY: CPT

## 2019-02-26 PROCEDURE — 96368 THER/DIAG CONCURRENT INF: CPT

## 2019-02-26 PROCEDURE — 82728 ASSAY OF FERRITIN: CPT

## 2019-02-26 PROCEDURE — 87086 URINE CULTURE/COLONY COUNT: CPT

## 2019-02-26 PROCEDURE — 92610 EVALUATE SWALLOWING FUNCTION: CPT

## 2019-02-26 PROCEDURE — 99285 EMERGENCY DEPT VISIT HI MDM: CPT | Mod: 25

## 2019-02-26 PROCEDURE — 85025 COMPLETE CBC W/AUTO DIFF WBC: CPT

## 2019-04-10 ENCOUNTER — EMERGENCY (EMERGENCY)
Facility: HOSPITAL | Age: 84
LOS: 1 days | Discharge: HOME | End: 2019-04-10
Attending: EMERGENCY MEDICINE | Admitting: EMERGENCY MEDICINE
Payer: MEDICARE

## 2019-04-10 VITALS
RESPIRATION RATE: 16 BRPM | DIASTOLIC BLOOD PRESSURE: 94 MMHG | HEART RATE: 68 BPM | TEMPERATURE: 98 F | SYSTOLIC BLOOD PRESSURE: 157 MMHG | OXYGEN SATURATION: 95 %

## 2019-04-10 DIAGNOSIS — Z79.899 OTHER LONG TERM (CURRENT) DRUG THERAPY: ICD-10-CM

## 2019-04-10 DIAGNOSIS — B20 HUMAN IMMUNODEFICIENCY VIRUS [HIV] DISEASE: ICD-10-CM
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
## 2019-04-10 DIAGNOSIS — Z88.1 ALLERGY STATUS TO OTHER ANTIBIOTIC AGENTS STATUS: ICD-10-CM

## 2019-04-10 DIAGNOSIS — S00.81XA ABRASION OF OTHER PART OF HEAD, INITIAL ENCOUNTER: ICD-10-CM
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
## 2019-04-10 PROCEDURE — 99284 EMERGENCY DEPT VISIT MOD MDM: CPT | Mod: 25

## 2019-04-10 PROCEDURE — 70450 CT HEAD/BRAIN W/O DYE: CPT | Mod: 26

## 2019-04-10 PROCEDURE — 72125 CT NECK SPINE W/O DYE: CPT | Mod: 26

## 2019-04-10 RX ORDER — ACETAMINOPHEN 500 MG
650 TABLET ORAL ONCE
Qty: 0 | Refills: 0 | Status: COMPLETED | OUTPATIENT
Start: 2019-04-10 | End: 2019-04-10

## 2019-04-10 RX ORDER — TETANUS TOXOID, REDUCED DIPHTHERIA TOXOID AND ACELLULAR PERTUSSIS VACCINE, ADSORBED 5; 2.5; 8; 8; 2.5 [IU]/.5ML; [IU]/.5ML; UG/.5ML; UG/.5ML; UG/.5ML
0.5 SUSPENSION INTRAMUSCULAR ONCE
Qty: 0 | Refills: 0 | Status: COMPLETED | OUTPATIENT
Start: 2019-04-10 | End: 2019-04-10

## 2019-04-10 RX ADMIN — Medication 650 MILLIGRAM(S): at 21:24

## 2019-04-10 RX ADMIN — TETANUS TOXOID, REDUCED DIPHTHERIA TOXOID AND ACELLULAR PERTUSSIS VACCINE, ADSORBED 0.5 MILLILITER(S): 5; 2.5; 8; 8; 2.5 SUSPENSION INTRAMUSCULAR at 21:24

## 2019-04-10 NOTE — ED PROVIDER NOTE - CLINICAL SUMMARY MEDICAL DECISION MAKING FREE TEXT BOX
85 y.o. male with probable parkinsons, s/p accidental fall, CT head and c spine with no injury, will stay in ED overnight, will check labs, his neighbor heather will come pick him up in AM

## 2019-04-10 NOTE — ED ADULT NURSE NOTE - NSIMPLEMENTINTERV_GEN_ALL_ED
Implemented All Fall Risk Interventions:  [place] to call system. Call bell, personal items and telephone within reach. Instruct patient to call for assistance. Room bathroom lighting operational. Non-slip footwear when patient is off stretcher. Physically safe environment: no spills, clutter or unnecessary equipment. Stretcher in lowest position, wheels locked, appropriate side rails in place. Provide visual cue, wrist band, yellow gown, etc. Monitor gait and stability. Monitor for mental status changes and reorient to person, place, and time. Review medications for side effects contributing to fall risk. Reinforce activity limits and safety measures with patient and family.

## 2019-04-10 NOTE — ED PROVIDER NOTE - PROGRESS NOTE DETAILS
INGE [phone] [facility] [phone] [facility] [phone] pt states he lives alone and feels too weak to go back home, his neighbor renae offered to take him home but he declined and requested to stay overnight, renae will walk him home in AM.

## 2019-04-10 NOTE — ED PROVIDER NOTE - OBJECTIVE STATEMENT
86 y/o male with PMHx of suspected Parkinson's Disease, takes Odefsey and baby ASA qd, allergy to Cipro, presents to the ED s/p fall today. Pt reports he was leaving his building when the door bumped him from behind causing him to fall forward. He denies LOC. He presents with multiple abrasions to his face. Pt denies alcohol use, no drug use, non-smoker. Tdap is not UTD. 84 y/o male with PMHx of HIV (takes Odefsey), suspected Parkinson's Disease, takes baby ASA qd, allergy to Cipro, presents to the ED s/p fall today. Pt reports he was leaving his building when the door bumped him from behind causing him to fall forward. He denies LOC. He presents with multiple abrasions to his face. Pt denies alcohol use, no drug use, non-smoker. Tdap is not UTD. 86 y/o male with PMHx of HIV (takes Odefsey), suspected Parkinson's Disease, takes baby ASA qd, chronic urinary retention and self catheterization currently on PO abx for UTI, allergy to Cipro, presents to the ED s/p fall today. Pt reports he was leaving his building when the door bumped him from behind causing him to fall forward. He denies LOC. He presents with multiple abrasions to his face. Pt denies alcohol use, no drug use, non-smoker. Tdap is not UTD.

## 2019-04-10 NOTE — ED ADULT TRIAGE NOTE - CHIEF COMPLAINT QUOTE
Patient fell forward after courtyard door hit him from behind , laceration noted to left eyebrow, denies any LOC anticoagulate

## 2019-04-11 VITALS
SYSTOLIC BLOOD PRESSURE: 122 MMHG | RESPIRATION RATE: 16 BRPM | HEART RATE: 65 BPM | DIASTOLIC BLOOD PRESSURE: 75 MMHG | OXYGEN SATURATION: 99 %

## 2019-04-11 LAB
ALBUMIN SERPL ELPH-MCNC: 2.9 G/DL — LOW (ref 3.4–5)
ALP SERPL-CCNC: 70 U/L — SIGNIFICANT CHANGE UP (ref 40–120)
ALT FLD-CCNC: 19 U/L — SIGNIFICANT CHANGE UP (ref 12–42)
ANION GAP SERPL CALC-SCNC: 8 MMOL/L — LOW (ref 9–16)
AST SERPL-CCNC: 20 U/L — SIGNIFICANT CHANGE UP (ref 15–37)
BASOPHILS NFR BLD AUTO: 0.4 % — SIGNIFICANT CHANGE UP (ref 0–2)
BILIRUB SERPL-MCNC: 0.3 MG/DL — SIGNIFICANT CHANGE UP (ref 0.2–1.2)
BUN SERPL-MCNC: 26 MG/DL — HIGH (ref 7–23)
CALCIUM SERPL-MCNC: 8.5 MG/DL — SIGNIFICANT CHANGE UP (ref 8.5–10.5)
CHLORIDE SERPL-SCNC: 111 MMOL/L — HIGH (ref 96–108)
CO2 SERPL-SCNC: 27 MMOL/L — SIGNIFICANT CHANGE UP (ref 22–31)
CREAT SERPL-MCNC: 1.13 MG/DL — SIGNIFICANT CHANGE UP (ref 0.5–1.3)
EOSINOPHIL NFR BLD AUTO: 3 % — SIGNIFICANT CHANGE UP (ref 0–6)
GLUCOSE SERPL-MCNC: 151 MG/DL — HIGH (ref 70–99)
HCT VFR BLD CALC: 29 % — LOW (ref 39–50)
HGB BLD-MCNC: 9.4 G/DL — LOW (ref 13–17)
IMM GRANULOCYTES NFR BLD AUTO: 0.4 % — SIGNIFICANT CHANGE UP (ref 0–1.5)
LYMPHOCYTES # BLD AUTO: 15.5 % — SIGNIFICANT CHANGE UP (ref 13–44)
MCHC RBC-ENTMCNC: 30 PG — SIGNIFICANT CHANGE UP (ref 27–34)
MCHC RBC-ENTMCNC: 32.4 G/DL — SIGNIFICANT CHANGE UP (ref 32–36)
MCV RBC AUTO: 92.7 FL — SIGNIFICANT CHANGE UP (ref 80–100)
MONOCYTES NFR BLD AUTO: 8.7 % — SIGNIFICANT CHANGE UP (ref 2–14)
NEUTROPHILS NFR BLD AUTO: 72 % — SIGNIFICANT CHANGE UP (ref 43–77)
PLATELET # BLD AUTO: 280 K/UL — SIGNIFICANT CHANGE UP (ref 150–400)
POTASSIUM SERPL-MCNC: 3.5 MMOL/L — SIGNIFICANT CHANGE UP (ref 3.5–5.3)
POTASSIUM SERPL-SCNC: 3.5 MMOL/L — SIGNIFICANT CHANGE UP (ref 3.5–5.3)
PROT SERPL-MCNC: 6.1 G/DL — LOW (ref 6.4–8.2)
RBC # BLD: 3.13 M/UL — LOW (ref 4.2–5.8)
RBC # FLD: 14 % — SIGNIFICANT CHANGE UP (ref 10.3–14.5)
SODIUM SERPL-SCNC: 146 MMOL/L — HIGH (ref 132–145)
WBC # BLD: 9.1 K/UL — SIGNIFICANT CHANGE UP (ref 3.8–10.5)
WBC # FLD AUTO: 9.1 K/UL — SIGNIFICANT CHANGE UP (ref 3.8–10.5)

## 2019-04-11 PROCEDURE — 99218: CPT | Mod: 25

## 2019-04-11 NOTE — ED CDU PROVIDER DISPOSITION NOTE - CARE PROVIDERS DIRECT ADDRESSES
[email or web address]

## 2019-04-11 NOTE — ED CDU PROVIDER INITIAL DAY NOTE - OBJECTIVE STATEMENT
84 y/o male with PMHx of HIV (takes Odefsey), suspected Parkinson's Disease, takes baby ASA qd, chronic urinary retention and self catheterization currently on PO abx for UTI, allergy to Cipro, presents to the ED s/p fall today. Pt reports he was leaving his building when the door bumped him from behind causing him to fall forward. He denies LOC. He presents with multiple abrasions to his face. Pt denies alcohol use, no drug use, non-smoker. Tdap is not UTD.

## 2019-04-11 NOTE — ED CDU PROVIDER DISPOSITION NOTE - NSFOLLOWUPINSTRUCTIONS_ED_ALL_ED_FT
Follow up with your primary care doctor  Please discuss with your primary care doctor about home aide/attendents  Please ask your super/manager of your building about the heavy door as it can hazardous to your health  Return immediately for any new or worsening symptoms or any new concerns

## 2019-04-11 NOTE — ED CDU PROVIDER DISPOSITION NOTE - CARE PROVIDER_API CALL
Sandeep Lopez)  Internal Medicine  [address] [place], [place] [id number]  Phone: [phone]  Fax: [phone]  Follow Up Time:     Daniel Samuels)  Internal Medicine  [address] [address]  Phone: [phone]  Fax: [phone]  Follow Up Time:

## 2019-04-11 NOTE — ED ADULT NURSE REASSESSMENT NOTE - NS ED NURSE REASSESS COMMENT FT1
patient having breakfast that neighbor brought, she is aware he will be picked up at 930a for transport home

## 2019-04-11 NOTE — ED CDU PROVIDER DISPOSITION NOTE - CLINICAL COURSE
pt w/ mechanical fall, no ich or cervical spine fx.  hypernatremia w/ similar values noted in prior visit.  pt placed on obs overnight for safety concern as he lives alone.  gigi.  pt states feels well in the AM, able to ambulate w/o assistance.  does not use cane at baseline but feels he might need one.  will arrange ambulette to transport pt home.  I also called his neighbor who will be there to help receive pt.

## 2019-04-11 NOTE — ED ADULT NURSE REASSESSMENT NOTE - NS ED NURSE REASSESS COMMENT FT1
Pt remains alert oriented x 4, call light within reach, ambulated to bathroom with minimal assistance. returned to bed, warm blankets provided

## 2019-04-11 NOTE — ED CDU PROVIDER INITIAL DAY NOTE - MEDICAL DECISION MAKING DETAILS
pt with probable parkinsons, fall with no serious injury but lives alone, his neighbor will come get him in AM

## 2019-04-18 ENCOUNTER — EMERGENCY (EMERGENCY)
Facility: HOSPITAL | Age: 84
LOS: 1 days | Discharge: HOME | End: 2019-04-18
Attending: EMERGENCY MEDICINE | Admitting: EMERGENCY MEDICINE
Payer: MEDICARE

## 2019-04-18 VITALS
DIASTOLIC BLOOD PRESSURE: 94 MMHG | SYSTOLIC BLOOD PRESSURE: 149 MMHG | RESPIRATION RATE: 16 BRPM | OXYGEN SATURATION: 98 % | TEMPERATURE: 99 F | HEART RATE: 63 BPM

## 2019-04-18 VITALS
DIASTOLIC BLOOD PRESSURE: 90 MMHG | OXYGEN SATURATION: 98 % | HEART RATE: 64 BPM | SYSTOLIC BLOOD PRESSURE: 162 MMHG | RESPIRATION RATE: 18 BRPM | TEMPERATURE: 98 F

## 2019-04-18 DIAGNOSIS — R42 DIZZINESS AND GIDDINESS: ICD-10-CM

## 2019-04-18 DIAGNOSIS — Z79.899 OTHER LONG TERM (CURRENT) DRUG THERAPY: ICD-10-CM

## 2019-04-18 DIAGNOSIS — R07.89 OTHER CHEST PAIN: ICD-10-CM

## 2019-04-18 DIAGNOSIS — Z88.1 ALLERGY STATUS TO OTHER ANTIBIOTIC AGENTS STATUS: ICD-10-CM

## 2019-04-18 LAB
ALBUMIN SERPL ELPH-MCNC: 3.2 G/DL — LOW (ref 3.4–5)
ALP SERPL-CCNC: 96 U/L — SIGNIFICANT CHANGE UP (ref 40–120)
ALT FLD-CCNC: 16 U/L — SIGNIFICANT CHANGE UP (ref 12–42)
ANION GAP SERPL CALC-SCNC: 9 MMOL/L — SIGNIFICANT CHANGE UP (ref 9–16)
APPEARANCE UR: CLEAR — SIGNIFICANT CHANGE UP
APTT BLD: 31.7 SEC — SIGNIFICANT CHANGE UP (ref 27.5–36.3)
AST SERPL-CCNC: 21 U/L — SIGNIFICANT CHANGE UP (ref 15–37)
BASOPHILS NFR BLD AUTO: 0.4 % — SIGNIFICANT CHANGE UP (ref 0–2)
BILIRUB SERPL-MCNC: 0.3 MG/DL — SIGNIFICANT CHANGE UP (ref 0.2–1.2)
BILIRUB UR-MCNC: NEGATIVE — SIGNIFICANT CHANGE UP
BUN SERPL-MCNC: 20 MG/DL — SIGNIFICANT CHANGE UP (ref 7–23)
CALCIUM SERPL-MCNC: 8.6 MG/DL — SIGNIFICANT CHANGE UP (ref 8.5–10.5)
CHLORIDE SERPL-SCNC: 112 MMOL/L — HIGH (ref 96–108)
CO2 SERPL-SCNC: 25 MMOL/L — SIGNIFICANT CHANGE UP (ref 22–31)
COLOR SPEC: YELLOW — SIGNIFICANT CHANGE UP
CREAT SERPL-MCNC: 0.83 MG/DL — SIGNIFICANT CHANGE UP (ref 0.5–1.3)
DIFF PNL FLD: NEGATIVE — SIGNIFICANT CHANGE UP
EOSINOPHIL NFR BLD AUTO: 1.6 % — SIGNIFICANT CHANGE UP (ref 0–6)
GLUCOSE SERPL-MCNC: 98 MG/DL — SIGNIFICANT CHANGE UP (ref 70–99)
GLUCOSE UR QL: NEGATIVE — SIGNIFICANT CHANGE UP
HCT VFR BLD CALC: 32.7 % — LOW (ref 39–50)
HGB BLD-MCNC: 10.2 G/DL — LOW (ref 13–17)
IMM GRANULOCYTES NFR BLD AUTO: 0.6 % — SIGNIFICANT CHANGE UP (ref 0–1.5)
INR BLD: 0.97 — SIGNIFICANT CHANGE UP (ref 0.88–1.16)
KETONES UR-MCNC: ABNORMAL MG/DL
LEUKOCYTE ESTERASE UR-ACNC: NEGATIVE — SIGNIFICANT CHANGE UP
LYMPHOCYTES # BLD AUTO: 20.7 % — SIGNIFICANT CHANGE UP (ref 13–44)
MAGNESIUM SERPL-MCNC: 2.1 MG/DL — SIGNIFICANT CHANGE UP (ref 1.6–2.6)
MCHC RBC-ENTMCNC: 29.1 PG — SIGNIFICANT CHANGE UP (ref 27–34)
MCHC RBC-ENTMCNC: 31.2 G/DL — LOW (ref 32–36)
MCV RBC AUTO: 93.2 FL — SIGNIFICANT CHANGE UP (ref 80–100)
MONOCYTES NFR BLD AUTO: 11 % — SIGNIFICANT CHANGE UP (ref 2–14)
NEUTROPHILS NFR BLD AUTO: 65.7 % — SIGNIFICANT CHANGE UP (ref 43–77)
NITRITE UR-MCNC: NEGATIVE — SIGNIFICANT CHANGE UP
NT-PROBNP SERPL-SCNC: 338 PG/ML — HIGH
PH UR: 6 — SIGNIFICANT CHANGE UP (ref 5–8)
PHOSPHATE SERPL-MCNC: 2.6 MG/DL — SIGNIFICANT CHANGE UP (ref 2.5–4.5)
PLATELET # BLD AUTO: 381 K/UL — SIGNIFICANT CHANGE UP (ref 150–400)
POTASSIUM SERPL-MCNC: 3.9 MMOL/L — SIGNIFICANT CHANGE UP (ref 3.5–5.3)
POTASSIUM SERPL-SCNC: 3.9 MMOL/L — SIGNIFICANT CHANGE UP (ref 3.5–5.3)
PROT SERPL-MCNC: 6.8 G/DL — SIGNIFICANT CHANGE UP (ref 6.4–8.2)
PROT UR-MCNC: ABNORMAL MG/DL
PROTHROM AB SERPL-ACNC: 10.7 SEC — SIGNIFICANT CHANGE UP (ref 10–12.9)
RBC # BLD: 3.51 M/UL — LOW (ref 4.2–5.8)
RBC # FLD: 13.7 % — SIGNIFICANT CHANGE UP (ref 10.3–14.5)
SODIUM SERPL-SCNC: 146 MMOL/L — HIGH (ref 132–145)
SP GR SPEC: 1.02 — SIGNIFICANT CHANGE UP (ref 1–1.03)
TROPONIN I SERPL-MCNC: 0.02 NG/ML — SIGNIFICANT CHANGE UP (ref 0.02–0.06)
TROPONIN I SERPL-MCNC: <0.017 NG/ML — LOW (ref 0.02–0.06)
UROBILINOGEN FLD QL: 0.2 E.U./DL — SIGNIFICANT CHANGE UP
WBC # BLD: 6.9 K/UL — SIGNIFICANT CHANGE UP (ref 3.8–10.5)
WBC # FLD AUTO: 6.9 K/UL — SIGNIFICANT CHANGE UP (ref 3.8–10.5)

## 2019-04-18 PROCEDURE — 71045 X-RAY EXAM CHEST 1 VIEW: CPT | Mod: 26

## 2019-04-18 PROCEDURE — 99285 EMERGENCY DEPT VISIT HI MDM: CPT | Mod: GC,25

## 2019-04-18 PROCEDURE — 93010 ELECTROCARDIOGRAM REPORT: CPT

## 2019-04-18 RX ORDER — EMTRICITABINE, RILPIVIRINE HYDROCHLORIDE, AND TENOFOVIR DISOPROXIL FUMARATE 200; 25; 300 MG/1; MG/1; MG/1
1 TABLET, FILM COATED ORAL
Qty: 0 | Refills: 0 | COMMUNITY

## 2019-04-18 RX ORDER — IBUPROFEN 200 MG
600 TABLET ORAL ONCE
Qty: 0 | Refills: 0 | Status: COMPLETED | OUTPATIENT
Start: 2019-04-18 | End: 2019-04-18

## 2019-04-18 RX ORDER — DEXAMETHASONE 0.5 MG/5ML
6 ELIXIR ORAL ONCE
Qty: 0 | Refills: 0 | Status: DISCONTINUED | OUTPATIENT
Start: 2019-04-18 | End: 2019-04-18

## 2019-04-18 RX ORDER — CARBIDOPA AND LEVODOPA 25; 100 MG/1; MG/1
0 TABLET ORAL
Qty: 0 | Refills: 0 | COMMUNITY

## 2019-04-18 RX ORDER — ASPIRIN/CALCIUM CARB/MAGNESIUM 324 MG
324 TABLET ORAL ONCE
Qty: 0 | Refills: 0 | Status: COMPLETED | OUTPATIENT
Start: 2019-04-18 | End: 2019-04-18

## 2019-04-18 RX ADMIN — Medication 600 MILLIGRAM(S): at 14:56

## 2019-04-18 RX ADMIN — Medication 324 MILLIGRAM(S): at 09:23

## 2019-04-18 NOTE — ED PROVIDER NOTE - NSFOLLOWUPINSTRUCTIONS_ED_ALL_ED_FT
Please, take over the counter pain medications, such as Motrin or Tylenol, as needed for pain  Please reach our to your primary care provider in order to get an extension for a home health aid.

## 2019-04-18 NOTE — ED ADULT NURSE NOTE - CHIEF COMPLAINT
"Occupational Therapy Treatment completed with focus on ADLs and ADL transfers.  Functional Status:  Max A supine>sit EOB, Total A LB dressing, Max A to stand, Total A stand pivot txf from EOB>Chair, CGA seated grooming - applied deodorant, brushed  Hair, washed face.   Plan of Care: Will benefit from Occupational Therapy 3 times per week  Discharge Recommendations:  Equipment Will Continue to Assess for Equipment Needs. Post-acute therapy Discharge to a transitional care facility for continued skilled therapy services.    See \"Rehab Therapy-Acute\" Patient Summary Report for complete documentation.     Pt seen for Acute OT tx session on this date. Pt primarily limited by fear, anxiety, and pain throughout session. Pt agreeable to session and mobility tasks. She continues to demo decreased balance, functional mobility, activity tolerance and is limited by cognitive impairments. Will continue to follow for Acute OT services.   " The patient is a 85y Male complaining of dizziness.

## 2019-04-18 NOTE — ED ADULT NURSE NOTE - OBJECTIVE STATEMENT
84 y/o male presents to ED with c/o dizziness and generalized weakness that has been worsening x1 week. AOx3, states has had multiple mechanical falls within last 6mo. Last fall a week ago and states "hit door when walking and fell and hit face. I have a black eye on L eye". Per patient, friend told patient to come to ED for further eval due to falls. Has been on ABX for recurrent UTI per PCP. Notes +cough x1 week. Denies LoC or blood thinner use, CP, SoB, fevers, chills.

## 2019-04-18 NOTE — ED PROVIDER NOTE - PROGRESS NOTE DETAILS
Pt was reassessed, asked about recent visit to the hospital for trip and fall, chest wall was examined and found to be tender on palpation. Pt believes that it could have been from recent fall. Pt was reassessed, symptoms have improved, spoke to , and understood the need to reach out to his PCP for a home health aid, since he lives alone and declining health wise, specially s/p the recent fall.

## 2019-04-18 NOTE — ED ADULT NURSE REASSESSMENT NOTE - NS ED NURSE REASSESS COMMENT FT1
patient straight cath here by RN for urine sample, patient reports self cath at home 2-3 times a day for BPH. the straight cath was passed without any difficulty and about 550ml of urine drained

## 2019-04-18 NOTE — ED PROVIDER NOTE - OBJECTIVE STATEMENT
86yo M w/ pmhx of HIV (takes Odefsey), ?Parkinson's Disease, hx of TIA on ASA, BPH w/ frequent UTI, on outpatient IV ?Abx,  brought in by EMS for lightheadedness and chest pain which started over night. Pt reports left sided chest pain, 4/10, intermittent, sharp, non-radiating. Denies any nausea, vomiting, diaphoresis, fever, chills, recent travel, leg swelling, hx of DVT or any other symptoms. Pt denies hx of smoking, no cardiac hx.

## 2019-04-18 NOTE — ED BEHAVIORAL HEALTH NOTE - BEHAVIORAL HEALTH NOTE
Sw was consulted to the  Ed to speak to the patient. The patient is an 85 year old male currently living alone in a 1 floor walk up. He does not use any DME. The patient was recently admitted at [facility] with a rehab stay at [facility] for 9days in which he returned home and declined home care. The patient is also a  but does not receive any services from them. [facility] and the application that he would have to fill out in order to get services. He was also provided with information for [facility] and the services they offer along with the application he has to fill out along with application that his primary has to sign for him. He was also provided with information for  services per his request. All of this information was also discussed with his friend, who sometimes helps him out with the patients permission. The patients friends did state that he is a hoarder and would also need his home cleaned out. The patient was also referred to APS (Adult Protective Services) via their online system due to his multiple recent falls, lack of care for himself, his short term memory loss and the possible hoarding situation. The referral number is [id number]. Worker made available for any further assistance needed.

## 2019-04-18 NOTE — ED PROVIDER NOTE - CLINICAL SUMMARY MEDICAL DECISION MAKING FREE TEXT BOX
84yo M w/ pmhx of HIV (takes Odefsey), ?Parkinson's Disease, hx of TIA on ASA, BPH w/ frequent UTI, on outpatient IV ?Abx,  brought in by EMS for lightheadedness and chest pain which started over night. Denies any . Pt denies hx of smoking, no cardiac hx. 84yo M w/ pmhx of HIV (takes Odefsey), ?Parkinson's Disease, hx of TIA on ASA, BPH w/ frequent UTI, on outpatient IV ?Abx,  brought in by EMS for lightheadedness and chest pain which started over night. Denies any other symptoms. 86yo M w/ pmhx of HIV (takes Odefsey), ?Parkinson's Disease, hx of TIA on ASA, BPH w/ frequent UTI, on outpatient IV ?Abx,  brought in by EMS for lightheadedness and chest pain which started over night. Denies any other symptoms. PE was remarkable for elevated BP, Systolic 170's, will send lab, obtain EKG and CXR to evaluate for ACS and other causes of chest pain, likely will be admitted for further cardiac workup 86yo M w/ pmhx of HIV (takes Odefsey), ?Parkinson's Disease, hx of TIA on ASA, BPH w/ frequent UTI, on outpatient IV ?Abx,  brought in by EMS for lightheadedness and chest pain which started over night. Denies any other symptoms. PE was remarkable for elevated BP, Systolic 170's, will send lab, obtain EKG and CXR to evaluate for other causes of chest pain, and reassess 86yo M w/ pmhx of HIV (takes Odefsey), ?Parkinson's Disease, hx of TIA on ASA, BPH w/ frequent UTI, on outpatient IV ?Abx,  brought in by EMS for lightheadedness and chest pain which started over night. Denies any other symptoms. PE was remarkable for elevated BP, Systolic 170's, will send lab, obtain EKG and CXR to evaluate for other causes of chest pain, and reassess    Follow up: Upon reassessment and given recent ER visit for mechanical fall, pt was found to have more musculoskeletal pain. X-ray was unremarkable for any fractures or PTX, labs were stable. Pt lives alone. He was seen by  here who recommended for him to reach out to his PCP for an extension to having an HHA.

## 2019-04-18 NOTE — ED ADULT NURSE NOTE - NSIMPLEMENTINTERV_GEN_ALL_ED
Implemented All Fall with Harm Risk Interventions:  [place] to call system. Call bell, personal items and telephone within reach. Instruct patient to call for assistance. Room bathroom lighting operational. Non-slip footwear when patient is off stretcher. Physically safe environment: no spills, clutter or unnecessary equipment. Stretcher in lowest position, wheels locked, appropriate side rails in place. Provide visual cue, wrist band, yellow gown, etc. Monitor gait and stability. Monitor for mental status changes and reorient to person, place, and time. Review medications for side effects contributing to fall risk. Reinforce activity limits and safety measures with patient and family. Provide visual clues: red socks.

## 2019-04-18 NOTE — ED ADULT NURSE NOTE - CHPI ED NUR SYMPTOMS NEG
no change in level of consciousness/no confusion/no blurred vision/no fever/no nausea/no loss of consciousness/no numbness/no vomiting

## 2019-04-20 ENCOUNTER — INPATIENT (INPATIENT)
Facility: HOSPITAL | Age: 84
LOS: 2 days | Discharge: NURSING, REHAB OR LONG-TERM CARE | DRG: 552 | End: 2019-04-23
Payer: MEDICARE

## 2019-04-20 VITALS
TEMPERATURE: 98 F | HEART RATE: 57 BPM | RESPIRATION RATE: 17 BRPM | OXYGEN SATURATION: 96 % | SYSTOLIC BLOOD PRESSURE: 127 MMHG | DIASTOLIC BLOOD PRESSURE: 80 MMHG

## 2019-04-20 DIAGNOSIS — Z29.9 ENCOUNTER FOR PROPHYLACTIC MEASURES, UNSPECIFIED: ICD-10-CM

## 2019-04-20 DIAGNOSIS — I63.9 CEREBRAL INFARCTION, UNSPECIFIED: ICD-10-CM

## 2019-04-20 DIAGNOSIS — R63.8 OTHER SYMPTOMS AND SIGNS CONCERNING FOOD AND FLUID INTAKE: ICD-10-CM

## 2019-04-20 DIAGNOSIS — N40.0 BENIGN PROSTATIC HYPERPLASIA WITHOUT LOWER URINARY TRACT SYMPTOMS: ICD-10-CM

## 2019-04-20 DIAGNOSIS — R62.7 ADULT FAILURE TO THRIVE: ICD-10-CM

## 2019-04-20 DIAGNOSIS — B20 HUMAN IMMUNODEFICIENCY VIRUS [HIV] DISEASE: ICD-10-CM

## 2019-04-20 DIAGNOSIS — Z91.89 OTHER SPECIFIED PERSONAL RISK FACTORS, NOT ELSEWHERE CLASSIFIED: ICD-10-CM

## 2019-04-20 DIAGNOSIS — G20 PARKINSON'S DISEASE: ICD-10-CM

## 2019-04-20 DIAGNOSIS — D64.9 ANEMIA, UNSPECIFIED: ICD-10-CM

## 2019-04-20 DIAGNOSIS — K59.00 CONSTIPATION, UNSPECIFIED: ICD-10-CM

## 2019-04-20 LAB
-  AMPICILLIN: SIGNIFICANT CHANGE UP
-  CIPROFLOXACIN: SIGNIFICANT CHANGE UP
-  DAPTOMYCIN: SIGNIFICANT CHANGE UP
-  LEVOFLOXACIN: SIGNIFICANT CHANGE UP
-  LINEZOLID: SIGNIFICANT CHANGE UP
-  NITROFURANTOIN: SIGNIFICANT CHANGE UP
-  TETRACYCLINE: SIGNIFICANT CHANGE UP
-  VANCOMYCIN: SIGNIFICANT CHANGE UP
ALBUMIN SERPL ELPH-MCNC: 3.4 G/DL — SIGNIFICANT CHANGE UP (ref 3.4–5)
ALP SERPL-CCNC: 92 U/L — SIGNIFICANT CHANGE UP (ref 40–120)
ALT FLD-CCNC: 18 U/L — SIGNIFICANT CHANGE UP (ref 12–42)
ANION GAP SERPL CALC-SCNC: 11 MMOL/L — SIGNIFICANT CHANGE UP (ref 9–16)
APPEARANCE UR: CLEAR — SIGNIFICANT CHANGE UP
AST SERPL-CCNC: 21 U/L — SIGNIFICANT CHANGE UP (ref 15–37)
BASOPHILS NFR BLD AUTO: 0.7 % — SIGNIFICANT CHANGE UP (ref 0–2)
BILIRUB SERPL-MCNC: 0.4 MG/DL — SIGNIFICANT CHANGE UP (ref 0.2–1.2)
BILIRUB UR-MCNC: NEGATIVE — SIGNIFICANT CHANGE UP
BUN SERPL-MCNC: 23 MG/DL — SIGNIFICANT CHANGE UP (ref 7–23)
CALCIUM SERPL-MCNC: 8.8 MG/DL — SIGNIFICANT CHANGE UP (ref 8.5–10.5)
CHLORIDE SERPL-SCNC: 110 MMOL/L — HIGH (ref 96–108)
CO2 SERPL-SCNC: 24 MMOL/L — SIGNIFICANT CHANGE UP (ref 22–31)
COLOR SPEC: YELLOW — SIGNIFICANT CHANGE UP
CREAT SERPL-MCNC: 0.87 MG/DL — SIGNIFICANT CHANGE UP (ref 0.5–1.3)
CULTURE RESULTS: SIGNIFICANT CHANGE UP
DIFF PNL FLD: NEGATIVE — SIGNIFICANT CHANGE UP
EOSINOPHIL NFR BLD AUTO: 1.6 % — SIGNIFICANT CHANGE UP (ref 0–6)
ETHANOL SERPL-MCNC: <3 MG/DL — SIGNIFICANT CHANGE UP
GLUCOSE SERPL-MCNC: 95 MG/DL — SIGNIFICANT CHANGE UP (ref 70–99)
GLUCOSE UR QL: NEGATIVE — SIGNIFICANT CHANGE UP
HCT VFR BLD CALC: 33 % — LOW (ref 39–50)
HGB BLD-MCNC: 10.4 G/DL — LOW (ref 13–17)
IMM GRANULOCYTES NFR BLD AUTO: 0.3 % — SIGNIFICANT CHANGE UP (ref 0–1.5)
KETONES UR-MCNC: ABNORMAL MG/DL
LEUKOCYTE ESTERASE UR-ACNC: NEGATIVE — SIGNIFICANT CHANGE UP
LYMPHOCYTES # BLD AUTO: 21.7 % — SIGNIFICANT CHANGE UP (ref 13–44)
MAGNESIUM SERPL-MCNC: 2.2 MG/DL — SIGNIFICANT CHANGE UP (ref 1.6–2.6)
MCHC RBC-ENTMCNC: 29 PG — SIGNIFICANT CHANGE UP (ref 27–34)
MCHC RBC-ENTMCNC: 31.5 G/DL — LOW (ref 32–36)
MCV RBC AUTO: 91.9 FL — SIGNIFICANT CHANGE UP (ref 80–100)
METHOD TYPE: SIGNIFICANT CHANGE UP
MONOCYTES NFR BLD AUTO: 8.6 % — SIGNIFICANT CHANGE UP (ref 2–14)
NEUTROPHILS NFR BLD AUTO: 67.1 % — SIGNIFICANT CHANGE UP (ref 43–77)
NITRITE UR-MCNC: NEGATIVE — SIGNIFICANT CHANGE UP
ORGANISM # SPEC MICROSCOPIC CNT: SIGNIFICANT CHANGE UP
ORGANISM # SPEC MICROSCOPIC CNT: SIGNIFICANT CHANGE UP
PCP SPEC-MCNC: SIGNIFICANT CHANGE UP
PH UR: 5.5 — SIGNIFICANT CHANGE UP (ref 5–8)
PLATELET # BLD AUTO: 408 K/UL — HIGH (ref 150–400)
POTASSIUM SERPL-MCNC: 3.6 MMOL/L — SIGNIFICANT CHANGE UP (ref 3.5–5.3)
POTASSIUM SERPL-SCNC: 3.6 MMOL/L — SIGNIFICANT CHANGE UP (ref 3.5–5.3)
PROT SERPL-MCNC: 6.9 G/DL — SIGNIFICANT CHANGE UP (ref 6.4–8.2)
PROT UR-MCNC: NEGATIVE MG/DL — SIGNIFICANT CHANGE UP
RBC # BLD: 3.59 M/UL — LOW (ref 4.2–5.8)
RBC # FLD: 13.6 % — SIGNIFICANT CHANGE UP (ref 10.3–14.5)
SODIUM SERPL-SCNC: 145 MMOL/L — SIGNIFICANT CHANGE UP (ref 132–145)
SP GR SPEC: 1.02 — SIGNIFICANT CHANGE UP (ref 1–1.03)
SPECIMEN SOURCE: SIGNIFICANT CHANGE UP
UROBILINOGEN FLD QL: 0.2 E.U./DL — SIGNIFICANT CHANGE UP
WBC # BLD: 7.6 K/UL — SIGNIFICANT CHANGE UP (ref 3.8–10.5)
WBC # FLD AUTO: 7.6 K/UL — SIGNIFICANT CHANGE UP (ref 3.8–10.5)

## 2019-04-20 PROCEDURE — 72170 X-RAY EXAM OF PELVIS: CPT | Mod: 26

## 2019-04-20 PROCEDURE — 72100 X-RAY EXAM L-S SPINE 2/3 VWS: CPT | Mod: 26

## 2019-04-20 PROCEDURE — 70450 CT HEAD/BRAIN W/O DYE: CPT | Mod: 26

## 2019-04-20 PROCEDURE — 99285 EMERGENCY DEPT VISIT HI MDM: CPT | Mod: 25

## 2019-04-20 PROCEDURE — 93010 ELECTROCARDIOGRAM REPORT: CPT

## 2019-04-20 PROCEDURE — 99223 1ST HOSP IP/OBS HIGH 75: CPT | Mod: GC

## 2019-04-20 RX ORDER — ATORVASTATIN CALCIUM 80 MG/1
10 TABLET, FILM COATED ORAL AT BEDTIME
Qty: 0 | Refills: 0 | Status: DISCONTINUED | OUTPATIENT
Start: 2019-04-20 | End: 2019-04-23

## 2019-04-20 RX ORDER — POLYETHYLENE GLYCOL 3350 17 G/17G
17 POWDER, FOR SOLUTION ORAL EVERY 24 HOURS
Qty: 0 | Refills: 0 | Status: DISCONTINUED | OUTPATIENT
Start: 2019-04-21 | End: 2019-04-23

## 2019-04-20 RX ORDER — EMTRICITABINE, RILPIVIRINE HYDROCHLORIDE, AND TENOFOVIR DISOPROXIL FUMARATE 200; 25; 300 MG/1; MG/1; MG/1
1 TABLET, FILM COATED ORAL DAILY
Qty: 0 | Refills: 0 | Status: DISCONTINUED | OUTPATIENT
Start: 2019-04-20 | End: 2019-04-23

## 2019-04-20 RX ORDER — HEPARIN SODIUM 5000 [USP'U]/ML
5000 INJECTION INTRAVENOUS; SUBCUTANEOUS EVERY 8 HOURS
Qty: 0 | Refills: 0 | Status: DISCONTINUED | OUTPATIENT
Start: 2019-04-20 | End: 2019-04-23

## 2019-04-20 RX ORDER — CARBIDOPA AND LEVODOPA 25; 100 MG/1; MG/1
1 TABLET ORAL
Qty: 0 | Refills: 0 | Status: DISCONTINUED | OUTPATIENT
Start: 2019-04-20 | End: 2019-04-23

## 2019-04-20 RX ADMIN — CARBIDOPA AND LEVODOPA 1 TABLET(S): 25; 100 TABLET ORAL at 18:14

## 2019-04-20 RX ADMIN — EMTRICITABINE, RILPIVIRINE HYDROCHLORIDE, AND TENOFOVIR DISOPROXIL FUMARATE 1 TABLET(S): 200; 25; 300 TABLET, FILM COATED ORAL at 21:32

## 2019-04-20 RX ADMIN — ATORVASTATIN CALCIUM 10 MILLIGRAM(S): 80 TABLET, FILM COATED ORAL at 21:30

## 2019-04-20 NOTE — H&P ADULT - PROBLEM SELECTOR PLAN 3
hx of HIV  - continue Odefsey daily with meals hx of parkinsons disease  - continue home dose sinemet 25/100 QID

## 2019-04-20 NOTE — H&P ADULT - ASSESSMENT
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

## 2019-04-20 NOTE — H&P ADULT - HISTORY OF PRESENT ILLNESS
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 via [facility] ED for failure to thrive and back pain. Patient has multiple recent visits to ED and [facility] for falls. Most recent fall was 10 days ago when the door to the building hit his back and he fell on left side of his face. Patient was discharged from [facility] on 2/3/19 for sepsis and FTT and was discharged to rehab at that time. During recent ED visits, patient refused rehab and long term care facilities. However, today patient became worried that he would fall and not be able to get up (he lives alone), so he therefore called an ambulance to take him to the ED. Patient also states that he has lower back pain which limits his ability to sit up. He states this is chronic. Patient states that he has not been eating well due to not being able to make food or order food. He refuses to accept his diagnosis of [facility]) for which he takes sinemet. Patient denies CP, HA, SOB, dizziness, N/V, abdominal pain. Denies recent urinary symptoms, fevers, cough. Of note, patient stated that he takes 4 baby aspirin every 4 hours (he reiterated this multiple times).    In the ED, vitals T 97.8, HR 57-77, -153/78-80, RR 17, O2sat 98% on RA. Patient had CT head that showed no acute changes, but showed chronic right basal ganglia infarct. Xray lumbar spine showed Generalized osteoporosis. Mild extra curvature of the lower thoracolumbar spine. Age-indeterminate compression fractures at T12 and L1. Multilevel loss of intervertebral disc height. Multilevel facet arthropathy. No spondylolisthesis. Labs significant for Hgb 10.4 (at baseline). Patient admitted to [facility] for failure to thrive. 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 Patient has multiple recent visits to ED and [facility] for falls. Most recent fall was 10 days ago when the door to the building hit his back and he fell on left side of his face. Patient was discharged from [facility] on 2/3/19 for sepsis and FTT and was discharged to rehab at that time. During recent ED visits, patient refused rehab and long term care facilities. However, today patient became worried that he would fall and not be able to get up (he lives alone), so he therefore called an ambulance to take him to the ED. Patient also states that he has lower back pain/left hip pain which limits his ability to sit up. He states this is chronic for the past 5 years. States that pain is worse when he tries to stand up and is improved with aspirin. No bowel or bladder incontinence. Patient states that he has not been eating well due to not being able to make food or order food. He refuses to accept his diagnosis of parkinsons disease ([facility]) for which he takes sinemet. Patient denies CP, HA, SOB, dizziness, N/V, abdominal pain. Denies recent urinary symptoms, fevers, cough. Of note, patient stated that he takes 4 baby aspirin every 4 hours (he reiterated this multiple times).    In the ED, vitals T 97.8, HR 57-77, -153/78-80, RR 17, O2sat 98% on RA. Patient had CT head that showed no acute changes, but showed chronic right basal ganglia infarct. Xray lumbar spine showed Generalized osteoporosis. Mild extra curvature of the lower thoracolumbar spine. Age-indeterminate compression fractures at T12 and L1. Multilevel loss of intervertebral disc height. Multilevel facet arthropathy. No spondylolisthesis. Labs significant for Hgb 10.4 (at baseline). Patient admitted to [facility] for failure to thrive.

## 2019-04-20 NOTE — ED ADULT NURSE NOTE - NSIMPLEMENTINTERV_GEN_ALL_ED
Implemented All Fall Risk Interventions:  [place] to call system. Call bell, personal items and telephone within reach. Instruct patient to call for assistance. Room bathroom lighting operational. Non-slip footwear when patient is off stretcher. Physically safe environment: no spills, clutter or unnecessary equipment. Stretcher in lowest position, wheels locked, appropriate side rails in place. Provide visual cue, wrist band, yellow gown, etc. Monitor gait and stability. Monitor for mental status changes and reorient to person, place, and time. Review medications for side effects contributing to fall risk. Reinforce activity limits and safety measures with patient and family.

## 2019-04-20 NOTE — ED PROVIDER NOTE - SKIN, MLM
Skin normal color for race, warm, dry and intact. Evidence of remote ecchymosis on the L side of the face

## 2019-04-20 NOTE — ED ADULT TRIAGE NOTE - CHIEF COMPLAINT QUOTE
Pt BIBA from home. Pt c.o weakness and lower back pain. Pt states "I can't gt out of bed without debilitating pain.". Pt denies pain at this time and states pain is a 8/10 with movement.

## 2019-04-20 NOTE — H&P ADULT - ATTENDING COMMENTS
85 year old male patient is admitted for failure to thrive and inability to take care of himself at home alone. Patient is poor historian. Doesn't have any family members, lives alone. Needed to confirm prescriptions with patients pharmacy as he is unsure on his current dosages and what exactly he is taking. [facility]  ad[facility] to [facility] as social admit. Will need PT evaluation and possible placement to nursing home or discharge with home health services. .

## 2019-04-20 NOTE — ED PROVIDER NOTE - NEUROLOGICAL, MLM
Alert and oriented, no focal deficits, globally weak with 4/5 motor in all 4 extremities.  No truncal ataxia.  No slurred speech or facial asymmetry

## 2019-04-20 NOTE — H&P ADULT - NSHPSOCIALHISTORY_GEN_ALL_CORE
Lives by himself in 1 story walk up. Never , no kids. Used to work at [employer] as . Never smoker, no drugs, occasional glass of wine.

## 2019-04-20 NOTE — ED PROVIDER NOTE - OBJECTIVE STATEMENT
84 y/o male with PMHx of HIV (takes Odefsey), suspected Parkinson's Disease, takes baby ASA qd, chronic urinary retention and self catheterization currently on PO abx for UTI, allergy to Cipro, presents with weakness and difficulty getting out of bed.  ALso complains of low back pain due to the "lumpy" bed he sleeps in.  Cannot quality of quantify this pain but DOES NOT attribute it to recent falls.  This is his 3rd visit this month for multiple related complaints.  Denies recent fall.  Last visit was evaluated by out SW team and offered HHA services as well as creating an ACS report.  He is accompanied by his friend.  Patient endorses being able to "go out" for food everyday.  When prompted about an admission and placement in a long term care facility he became defensive and refused it. 86 y/o male with PMHx of HIV (takes Odefsey), suspected Parkinson's Disease, takes baby ASA qd, chronic urinary retention and self catheterization allergy to Cipro, presents with weakness and difficulty getting out of bed.  ALso complains of low back pain due to the "lumpy" bed he sleeps in.  Cannot quality of quantify this pain but DOES NOT attribute it to recent falls.  This is his 3rd visit this month for multiple related complaints.  Denies recent fall.  Last visit was evaluated by out  team and offered HHA services as well as creating an ACS report.  He is accompanied by his friend.  Patient endorses being able to "go out" for food everyday.  When prompted about an admission and placement in a long term care facility he became defensive and refused it.

## 2019-04-20 NOTE — H&P ADULT - NSHPLABSRESULTS_GEN_ALL_CORE
.  LABS:                         10.4   7.6   )-----------( 408      ( 2019 08:49 )             33.0     04-    145  |  110<H>  |  23  ----------------------------<  95  3.6   |  24  |  0.87    Ca    8.8      2019 08:49  Mg     2.2         TPro  6.9  /  Alb  3.4  /  TBili  0.4  /  DBili  x   /  AST  21  /  ALT  18  /  AlkPhos  92        Urinalysis Basic - ( 2019 08:58 )    Color: Yellow / Appearance: Clear / S.025 / pH: x  Gluc: x / Ketone: Trace mg/dL  / Bili: NEGATIVE / Urobili: 0.2 E.U./dL   Blood: x / Protein: NEGATIVE mg/dL / Nitrite: NEGATIVE   Leuk Esterase: NEGATIVE / RBC: x / WBC x   Sq Epi: x / Non Sq Epi: x / Bacteria: x                RADIOLOGY, EKG & ADDITIONAL TESTS:     < from: CT Head No Cont (19 @ 11:35) >    IMPRESSION:   No acute intracranial hemorrhage, mass effect, or recent transcortical   infarction.    Redemonstration of chronic right basal ganglia infarct.    Unchanged soft tissue mass in the right nasal cavity and nasopharynx,   direct inspection is recommended if not previosly performed.    < end of copied text >    < from: Xray Lumbar Spine AP + Lateral (19 @ 09:20) >    Impression:  Generalized osteoporosis. Mild extra curvature of the lower thoracolumbar   spine. Age-indeterminate compression fractures at T12 and L1. Multilevel   loss of intervertebral disc height. Multilevel facet arthropathy. No   spondylolisthesis.    < end of copied text >    Impression:   Generalized osteoporosis. No acute fracture or traumatic malalignment   appreciated.    < end of copied text >    < from: Xray Chest 1 View AP/PA (19 @ 09:02) >    A portable frontal view of the chest is compared to the prior study dated   2019. Heart size is again mildly enlarged. No pulmonary   consolidation is seen. No pleural effusion or pneumothorax. Chronic   changes of the left shoulder.      IMPRESSION: No significant change.    < end of copied text > .  LABS:                         10.4   7.6   )-----------( 408      ( 2019 08:49 )             33.0     -    145  |  110<H>  |  23  ----------------------------<  95  3.6   |  24  |  0.87    Ca    8.8      2019 08:49  Mg     2.2         TPro  6.9  /  Alb  3.4  /  TBili  0.4  /  DBili  x   /  AST  21  /  ALT  18  /  AlkPhos  92        Urinalysis Basic - ( 2019 08:58 )    Color: Yellow / Appearance: Clear / S.025 / pH: x  Gluc: x / Ketone: Trace mg/dL  / Bili: NEGATIVE / Urobili: 0.2 E.U./dL   Blood: x / Protein: NEGATIVE mg/dL / Nitrite: NEGATIVE   Leuk Esterase: NEGATIVE / RBC: x / WBC x   Sq Epi: x / Non Sq Epi: x / Bacteria: x                RADIOLOGY, EKG & ADDITIONAL TESTS:     < from: CT Head No Cont (19 @ 11:35) >    No acute intracranial hemorrhage, mass effect, or recent transcortical   infarction. Redemonstration of chronic right basal ganglia infarct. Unchanged soft tissue mass in the right nasal cavity and nasopharynx,   direct inspection is recommended if not previously performed.      Xray Lumbar Spine AP + Lateral (19 @ 09:20    Generalized osteoporosis. No acute fracture or traumatic malalignment   appreciated.

## 2019-04-20 NOTE — ED PROVIDER NOTE - CLINICAL SUMMARY MEDICAL DECISION MAKING FREE TEXT BOX
Admit for failure to thrive, ataxia, deconditioning.  This is his third ED visit this month for similar complaints and was admitted earlier in the year for the same reason.  Seen by our SW two days ago.  Now amenable to LTC placement.  Spoke with his neighbor Yvette and his nephew Jose.  Evidence of remote facial injury, no new injuries identified.  EKG, back/pelvis films, labs, CT brain reviewed.  No acute emergency condition identified.  Attempted to walk with walker and he was too unsteady and ataxic.  D/w Dr. Hartley at [facility] for admission.

## 2019-04-20 NOTE — H&P ADULT - PROBLEM SELECTOR PLAN 6
hx of normocytic anemia with RBCs ranging 8-10.5. likely AOCD vs nutritional deficiency  - currently 10.4  - f/u am iron studies  - f/u vit b12 and folate  - transfuse for goal hgb>7  - maintain active T and S

## 2019-04-20 NOTE — H&P ADULT - NSICDXPASTMEDICALHX_GEN_ALL_CORE_FT
PAST MEDICAL HISTORY:  Cerebrovascular accident (CVA)     HIV (human immunodeficiency virus infection)     Parkinsons disease

## 2019-04-20 NOTE — H&P ADULT - PROBLEM SELECTOR PLAN 10
1) PCP Contacted on Admission: (Y/N) --> Name & Phone #: N. admitted on saturday. Dr. Mauricio Silvestre [phone]  2) Date of Contact with PCP:  3) PCP Contacted at Discharge: (Y/N)  4) Summary of Handoff Given to PCP:   5) Post-Discharge Appointment Date and Location:

## 2019-04-20 NOTE — H&P ADULT - PROBLEM SELECTOR PLAN 1
Patient with multiple recent falls, unsteady on feet, unable to care for self at home, including malnutrition  - PT and OT consult  - nutrition consult for malnutrition  - social work and  consult

## 2019-04-20 NOTE — H&P ADULT - PROBLEM SELECTOR PLAN 4
Patient with hx of bph. self catheterizes at home leading to frequent UTI. does not report being on BPH meds. aguero catheter placed at [facility] ED  - continue aguero for now  - obtain collateral with PCP hx of HIV  - continue Odefsey daily with meals -if patient doesn't have adequate caloric intake might need to change medication as rilpivirine absorption can compromised -without adequate in diet. [facility] if patient is not virally suppressed will consider changing regimen to descovy and tivicay.

## 2019-04-20 NOTE — H&P ADULT - NSHPPHYSICALEXAM_GEN_ALL_CORE
.  VITAL SIGNS:  T(C): 36.9 (04-20-19 @ 15:29), Max: 37.1 (04-20-19 @ 13:18)  T(F): 98.5 (04-20-19 @ 15:29), Max: 98.7 (04-20-19 @ 13:18)  HR: 65 (04-20-19 @ 15:29) (57 - 77)  BP: 154/89 (04-20-19 @ 15:29) (117/78 - 154/89)  BP(mean): --  RR: 18 (04-20-19 @ 15:29) (16 - 18)  SpO2: 97% (04-20-19 @ 15:29) (96% - 98%)  Wt(kg): --    PHYSICAL EXAM:    Constitutional: elderly male, thin, dirty finger nails, resting comfortably in bed; NAD, slowed speech, soft spoken  Head: scab around lateral aspect of left eye with some erythema underneath the eye  Eyes: PERRL, EOMI, anicteric sclera  ENT: no nasal discharge; uvula midline, no oropharyngeal erythema or exudates; MMM  Neck: supple; no JVD or thyromegaly  Respiratory: CTA B/L; no W/R/R, no retractions  Cardiac: +S1/S2; RRR; no M/R/G  Gastrointestinal: abdomen soft, NT/ND; no rebound or guarding; +BS  Genitourinary: aguero in place with dried blood at tip of penis  Back: spine midline, no bony tenderness; no CVAT B/L; unable to fully lean forward due to pain however  Extremities: WWP, no clubbing or cyanosis; no peripheral edema  Musculoskeletal: cogwheel rigidity LUE>RUE; no joint swelling, tenderness or erythema  Vascular: 2+ radial, DP pulses B/L  Lymphatic: no submandibular or cervical LAD  Neurologic: AAOx3; CNII-XII grossly intact; no focal deficits, cogwheel rigidity more pronounced in UEs, 4+/5 strength in all extremities  Psychiatric: slowness of speech, monotonous, soft spoken, otherwise normal behaviors appearance

## 2019-04-20 NOTE — H&P ADULT - PROBLEM SELECTOR PLAN 5
hx of chronic constipation - likely component of parkinsons and immobility. most recently BM day prior to admission  - bowel regiment of senna, colace, and miralax hx of chronic constipation - likely component of parkinsons and immobility. most recently BM day prior to admission  - bowel regiment with miralax will advance if doesn't have a bowel movement

## 2019-04-20 NOTE — ED ADULT NURSE NOTE - OBJECTIVE STATEMENT
84 y/o M who comes into the ed c/o generalized weakness for weeks. pt reports he lives at home alone with a staircase, unable to get around without feeling afraid of falling secondary to weakness. pt seen in ED and consulted by CHRISTOPHER. pt accompanied by friend who reports he is unsure if patient is safe at home alone at this time.

## 2019-04-20 NOTE — H&P ADULT - PROBLEM SELECTOR PLAN 2
hx of parkinsons disease  - continue home dose sinemet 25/100 QID Patient with hx of bph. self catheterizes at home leading to frequent UTI. does not report being on BPH meds. Aguero catheter placed at [facility] ED  - continue aguero for now  - obtain collateral with PCP

## 2019-04-20 NOTE — H&P ADULT - PROBLEM SELECTOR PLAN 9
DVT: [facility]    Code status: DVT: SQH    Code status: DNR/DNI, molst in chart

## 2019-04-21 LAB
ALBUMIN SERPL ELPH-MCNC: 3.7 G/DL — SIGNIFICANT CHANGE UP (ref 3.3–5)
ALP SERPL-CCNC: 78 U/L — SIGNIFICANT CHANGE UP (ref 40–120)
ALT FLD-CCNC: <5 U/L — LOW (ref 10–45)
ANION GAP SERPL CALC-SCNC: 10 MMOL/L — SIGNIFICANT CHANGE UP (ref 5–17)
AST SERPL-CCNC: 13 U/L — SIGNIFICANT CHANGE UP (ref 10–40)
BASOPHILS # BLD AUTO: 0.04 K/UL — SIGNIFICANT CHANGE UP (ref 0–0.2)
BASOPHILS NFR BLD AUTO: 0.6 % — SIGNIFICANT CHANGE UP (ref 0–2)
BILIRUB SERPL-MCNC: 0.3 MG/DL — SIGNIFICANT CHANGE UP (ref 0.2–1.2)
BLD GP AB SCN SERPL QL: NEGATIVE — SIGNIFICANT CHANGE UP
BUN SERPL-MCNC: 21 MG/DL — SIGNIFICANT CHANGE UP (ref 7–23)
CALCIUM SERPL-MCNC: 8.7 MG/DL — SIGNIFICANT CHANGE UP (ref 8.4–10.5)
CHLORIDE SERPL-SCNC: 109 MMOL/L — HIGH (ref 96–108)
CO2 SERPL-SCNC: 25 MMOL/L — SIGNIFICANT CHANGE UP (ref 22–31)
CREAT SERPL-MCNC: 0.76 MG/DL — SIGNIFICANT CHANGE UP (ref 0.5–1.3)
EOSINOPHIL # BLD AUTO: 0.22 K/UL — SIGNIFICANT CHANGE UP (ref 0–0.5)
EOSINOPHIL NFR BLD AUTO: 3.5 % — SIGNIFICANT CHANGE UP (ref 0–6)
FERRITIN SERPL-MCNC: 20 NG/ML — LOW (ref 30–400)
GLUCOSE SERPL-MCNC: 99 MG/DL — SIGNIFICANT CHANGE UP (ref 70–99)
HCT VFR BLD CALC: 33.1 % — LOW (ref 39–50)
HGB BLD-MCNC: 10.4 G/DL — LOW (ref 13–17)
IMM GRANULOCYTES NFR BLD AUTO: 0.5 % — SIGNIFICANT CHANGE UP (ref 0–1.5)
IRON SATN MFR SERPL: 24 UG/DL — LOW (ref 45–165)
IRON SATN MFR SERPL: 8 % — LOW (ref 16–55)
LYMPHOCYTES # BLD AUTO: 1.58 K/UL — SIGNIFICANT CHANGE UP (ref 1–3.3)
LYMPHOCYTES # BLD AUTO: 25.2 % — SIGNIFICANT CHANGE UP (ref 13–44)
MAGNESIUM SERPL-MCNC: 2.1 MG/DL — SIGNIFICANT CHANGE UP (ref 1.6–2.6)
MCHC RBC-ENTMCNC: 29.4 PG — SIGNIFICANT CHANGE UP (ref 27–34)
MCHC RBC-ENTMCNC: 31.4 GM/DL — LOW (ref 32–36)
MCV RBC AUTO: 93.5 FL — SIGNIFICANT CHANGE UP (ref 80–100)
MONOCYTES # BLD AUTO: 0.77 K/UL — SIGNIFICANT CHANGE UP (ref 0–0.9)
MONOCYTES NFR BLD AUTO: 12.3 % — SIGNIFICANT CHANGE UP (ref 2–14)
NEUTROPHILS # BLD AUTO: 3.62 K/UL — SIGNIFICANT CHANGE UP (ref 1.8–7.4)
NEUTROPHILS NFR BLD AUTO: 57.9 % — SIGNIFICANT CHANGE UP (ref 43–77)
NRBC # BLD: 0 /100 WBCS — SIGNIFICANT CHANGE UP (ref 0–0)
PLATELET # BLD AUTO: 414 K/UL — HIGH (ref 150–400)
POTASSIUM SERPL-MCNC: 3.4 MMOL/L — LOW (ref 3.5–5.3)
POTASSIUM SERPL-SCNC: 3.4 MMOL/L — LOW (ref 3.5–5.3)
PROT SERPL-MCNC: 6.4 G/DL — SIGNIFICANT CHANGE UP (ref 6–8.3)
RBC # BLD: 3.54 M/UL — LOW (ref 4.2–5.8)
RBC # FLD: 13.3 % — SIGNIFICANT CHANGE UP (ref 10.3–14.5)
RH IG SCN BLD-IMP: POSITIVE — SIGNIFICANT CHANGE UP
SODIUM SERPL-SCNC: 144 MMOL/L — SIGNIFICANT CHANGE UP (ref 135–145)
TIBC SERPL-MCNC: 284 UG/DL — SIGNIFICANT CHANGE UP (ref 220–430)
TRANSFERRIN SERPL-MCNC: 244 MG/DL — SIGNIFICANT CHANGE UP (ref 200–360)
UIBC SERPL-MCNC: 260 UG/DL — SIGNIFICANT CHANGE UP (ref 110–370)
WBC # BLD: 6.26 K/UL — SIGNIFICANT CHANGE UP (ref 3.8–10.5)
WBC # FLD AUTO: 6.26 K/UL — SIGNIFICANT CHANGE UP (ref 3.8–10.5)

## 2019-04-21 PROCEDURE — 99232 SBSQ HOSP IP/OBS MODERATE 35: CPT | Mod: GC

## 2019-04-21 RX ORDER — POTASSIUM CHLORIDE 20 MEQ
40 PACKET (EA) ORAL ONCE
Qty: 0 | Refills: 0 | Status: COMPLETED | OUTPATIENT
Start: 2019-04-21 | End: 2019-04-21

## 2019-04-21 RX ORDER — POTASSIUM CHLORIDE 20 MEQ
20 PACKET (EA) ORAL ONCE
Qty: 0 | Refills: 0 | Status: COMPLETED | OUTPATIENT
Start: 2019-04-21 | End: 2019-04-21

## 2019-04-21 RX ORDER — POTASSIUM CHLORIDE 20 MEQ
60 PACKET (EA) ORAL ONCE
Qty: 0 | Refills: 0 | Status: DISCONTINUED | OUTPATIENT
Start: 2019-04-21 | End: 2019-04-21

## 2019-04-21 RX ADMIN — CARBIDOPA AND LEVODOPA 1 TABLET(S): 25; 100 TABLET ORAL at 06:55

## 2019-04-21 RX ADMIN — CARBIDOPA AND LEVODOPA 1 TABLET(S): 25; 100 TABLET ORAL at 00:25

## 2019-04-21 RX ADMIN — CARBIDOPA AND LEVODOPA 1 TABLET(S): 25; 100 TABLET ORAL at 19:56

## 2019-04-21 RX ADMIN — EMTRICITABINE, RILPIVIRINE HYDROCHLORIDE, AND TENOFOVIR DISOPROXIL FUMARATE 1 TABLET(S): 200; 25; 300 TABLET, FILM COATED ORAL at 12:31

## 2019-04-21 RX ADMIN — CARBIDOPA AND LEVODOPA 1 TABLET(S): 25; 100 TABLET ORAL at 12:30

## 2019-04-21 RX ADMIN — Medication 40 MILLIEQUIVALENT(S): at 08:43

## 2019-04-21 RX ADMIN — ATORVASTATIN CALCIUM 10 MILLIGRAM(S): 80 TABLET, FILM COATED ORAL at 21:44

## 2019-04-21 RX ADMIN — Medication 20 MILLIEQUIVALENT(S): at 12:30

## 2019-04-21 RX ADMIN — POLYETHYLENE GLYCOL 3350 17 GRAM(S): 17 POWDER, FOR SOLUTION ORAL at 06:52

## 2019-04-21 NOTE — PHYSICAL THERAPY INITIAL EVALUATION ADULT - CRITERIA FOR SKILLED THERAPEUTIC INTERVENTIONS
anticipated discharge recommendation/impairments found/risk reduction/prevention/functional limitations in following categories/rehab potential/therapy frequency

## 2019-04-21 NOTE — PHYSICAL THERAPY INITIAL EVALUATION ADULT - PLANNED THERAPY INTERVENTIONS, PT EVAL
postural re-education/transfer training/gait training/balance training/bed mobility training/strengthening

## 2019-04-21 NOTE — PROGRESS NOTE ADULT - SUBJECTIVE AND OBJECTIVE BOX
OVERNIGHT EVENTS: HATTIE    SUBJECTIVE / INTERVAL HPI: Patient seen and examined at bedside. pt has hip pain but otherwise no complaints    REVIEW OF SYSTEMS:    CONSTITUTIONAL: No fevers or chills  EYES/ENT: No visual or hearing changes; no throat pain or difficulty swallowing  NECK: No pain or stiffness  RESPIRATORY: No cough, wheezing; No shortness of breath  CARDIOVASCULAR: No chest pain or palpitations  GASTROINTESTINAL: No abdominal pain. No nausea, vomiting; No diarrhea or constipation.  GENITOURINARY: No dysuria, frequency or hematuria  NEUROLOGICAL: No numbness or weakness  SKIN: No itching, burning, rashes, or lesions   All other review of systems is negative unless indicated above.    VITAL SIGNS:  Vital Signs Last 24 Hrs  T(C): 36.5 (2019 08:41), Max: 37.1 (2019 13:18)  T(F): 97.7 (2019 08:41), Max: 98.7 (2019 13:18)  HR: 64 (2019 08:41) (60 - 77)  BP: 134/79 (2019 08:41) (117/78 - 154/89)  BP(mean): --  RR: 20 (2019 08:41) (16 - 20)  SpO2: 96% (2019 08:41) (96% - 98%)    PHYSICAL EXAM:    General: thin elderly male in NAD  HEENT: NC/AT; PERRL, anicteric sclera; MMM  Neck: supple  Cardiovascular: +S1/S2, RRR  Respiratory: CTA B/L; no W/R/R  Gastrointestinal: soft, NT/ND; +BSx4  Extremities: WWP; no edema, clubbing or cyanosis  Vascular: 2+ radial, DP/PT pulses B/L  Neurological: AAOx3; no focal deficits  aguero in place    MEDICATIONS:  MEDICATIONS  (STANDING):  atorvastatin 10 milliGRAM(s) Oral at bedtime  carbidopa/levodopa  25/100 1 Tablet(s) Oral four times a day  emtricitabine 200 mG/rilpivirine 25 mG/tenofovir alafenamide 25 mG (ODEFSEY) Tablet 1 Tablet(s) Oral daily  heparin  Injectable 5000 Unit(s) SubCutaneous every 8 hours  polyethylene glycol 3350 17 Gram(s) Oral every 24 hours  potassium chloride    Tablet ER 20 milliEquivalent(s) Oral once    MEDICATIONS  (PRN):      ALLERGIES:  Allergies    Cipro (Unknown)    Intolerances        LABS:                        10.4   6.26  )-----------( 414      ( 2019 06:13 )             33.1     -    144  |  109<H>  |  21  ----------------------------<  99  3.4<L>   |  25  |  0.76    Ca    8.7      2019 06:14  Mg     2.1         TPro  6.4  /  Alb  3.7  /  TBili  0.3  /  DBili  x   /  AST  13  /  ALT  <5<L>  /  AlkPhos  78        Urinalysis Basic - ( 2019 08:58 )    Color: Yellow / Appearance: Clear / S.025 / pH: x  Gluc: x / Ketone: Trace mg/dL  / Bili: NEGATIVE / Urobili: 0.2 E.U./dL   Blood: x / Protein: NEGATIVE mg/dL / Nitrite: NEGATIVE   Leuk Esterase: NEGATIVE / RBC: x / WBC x   Sq Epi: x / Non Sq Epi: x / Bacteria: x      CAPILLARY BLOOD GLUCOSE      POCT Blood Glucose.: 92 mg/dL (2019 08:41)      RADIOLOGY & ADDITIONAL TESTS: Reviewed.

## 2019-04-21 NOTE — PHYSICAL THERAPY INITIAL EVALUATION ADULT - ADDITIONAL COMMENTS
Patient lives alone in a one-story walk up with no steps to enter from outside. Prior to admission, patient states he was independent for all functional mobility and ADLs without assistive device but does endorse history of falls, the most recent being ten days ago

## 2019-04-21 NOTE — PHYSICAL THERAPY INITIAL EVALUATION ADULT - GENERAL OBSERVATIONS, REHAB EVAL
Received sleeping supine in bed with +bed alarm, on room air, +hep lock, +Stevens catheter, in no apparent distress

## 2019-04-21 NOTE — PHYSICAL THERAPY INITIAL EVALUATION ADULT - PATIENT/FAMILY/SIGNIFICANT OTHER GOALS STATEMENT, PT EVAL
Patient is willing and motivated to participate in physical therapy and would like to sit up for breakfast

## 2019-04-21 NOTE — PHYSICAL THERAPY INITIAL EVALUATION ADULT - LEVEL OF INDEPENDENCE: SIT/SUPINE, REHAB EVAL
not observed, patient left sitting in bedside chair with +chair alarm, all lines intact, left as received, +call melaina, RN (He) aware, in no apparent distress

## 2019-04-21 NOTE — PHYSICAL THERAPY INITIAL EVALUATION ADULT - PERTINENT HX OF CURRENT PROBLEM, REHAB EVAL
Patient is a 85 year old M with PMH of BPH (self catheterization at home), Parkinson's disease, CVA without residual deficits, chronic constipation, and HIV (on Odefsey) who presents to [facility] via [facility] ED for failure to thrive and back pain. Patient has multiple recent visits to ED and H for falls.

## 2019-04-21 NOTE — PHYSICAL THERAPY INITIAL EVALUATION ADULT - LEVEL OF INDEPENDENCE: CHAIR TO BED, REHAB EVAL
not observed, patient left sitting in bedside chair with +chair alarm, all lines intact, left as received, +call melania, RN (He) aware, in no apparent distress

## 2019-04-22 PROCEDURE — 99233 SBSQ HOSP IP/OBS HIGH 50: CPT | Mod: GC

## 2019-04-22 RX ADMIN — EMTRICITABINE, RILPIVIRINE HYDROCHLORIDE, AND TENOFOVIR DISOPROXIL FUMARATE 1 TABLET(S): 200; 25; 300 TABLET, FILM COATED ORAL at 12:03

## 2019-04-22 RX ADMIN — CARBIDOPA AND LEVODOPA 1 TABLET(S): 25; 100 TABLET ORAL at 17:16

## 2019-04-22 RX ADMIN — HEPARIN SODIUM 5000 UNIT(S): 5000 INJECTION INTRAVENOUS; SUBCUTANEOUS at 22:25

## 2019-04-22 RX ADMIN — CARBIDOPA AND LEVODOPA 1 TABLET(S): 25; 100 TABLET ORAL at 00:16

## 2019-04-22 RX ADMIN — POLYETHYLENE GLYCOL 3350 17 GRAM(S): 17 POWDER, FOR SOLUTION ORAL at 06:35

## 2019-04-22 RX ADMIN — CARBIDOPA AND LEVODOPA 1 TABLET(S): 25; 100 TABLET ORAL at 12:03

## 2019-04-22 RX ADMIN — ATORVASTATIN CALCIUM 10 MILLIGRAM(S): 80 TABLET, FILM COATED ORAL at 22:25

## 2019-04-22 RX ADMIN — CARBIDOPA AND LEVODOPA 1 TABLET(S): 25; 100 TABLET ORAL at 06:35

## 2019-04-22 NOTE — PROGRESS NOTE ADULT - PROBLEM SELECTOR PLAN 4
hx of HIV  - continue Odefsey daily with meals -if patient doesn't have adequate caloric intake might need to change medication as rilpivirine absorption can compromised -without adequate in diet. Will try to obtain past records from [facility] if patient is not virally suppressed will consider changing regimen to descovy and tivicay.
hx of HIV  - continue Odefsey daily with meals -if patient doesn't have adequate caloric intake might need to change medication as rilpivirine absorption can compromised -without adequate in diet. Will try to obtain past records from [facility] if patient is not virally suppressed will consider changing regimen to descovy and tivicay.

## 2019-04-22 NOTE — PROGRESS NOTE ADULT - ASSESSMENT
84 yo M with PMH of BPH (self catheterization at home), parkinsons disease, CVA without residual deficits, chronic constipation, and HIV (on Odefsey) who presents to [facility] via [facility] ED for failure to thrive and back pain.
84 yo M with PMH of BPH (self catheterization at home), parkinsons disease, CVA without residual deficits, chronic constipation, and HIV (on Odefsey) who presents to [facility] via [facility] ED for failure to thrive and back pain.

## 2019-04-22 NOTE — DIETITIAN INITIAL EVALUATION ADULT. - PROBLEM SELECTOR PLAN 10
1) PCP Contacted on Admission: (Y/N) --> Name & Phone #: N. admitted on saturday. Dr. Mauricio Silvestre [phone]  2) Date of Contact with PCP:  3) PCP Contacted at Discharge: (Y/N)  4) Summary of Handoff Given to PCP:   5) Post-Discharge Appointment Date and Location:

## 2019-04-22 NOTE — DIETITIAN INITIAL EVALUATION ADULT. - PROBLEM SELECTOR PLAN 4
hx of HIV  - continue Odefsey daily with meals -if patient doesn't have adequate caloric intake might need to change medication as rilpivirine absorption can compromised -without adequate in diet. Will try to [facility] if patient is not virally suppressed will consider changing regimen to descovy and tivicay.

## 2019-04-22 NOTE — PROGRESS NOTE ADULT - PROBLEM SELECTOR PLAN 10
1) PCP Contacted on Admission: (Y/N) --> Name & Phone #: N. admitted on saturday. Dr. Mauricio Silvestre [phone]  2) Date of Contact with PCP:  3) PCP Contacted at Discharge: (Y/N)  4) Summary of Handoff Given to PCP:   5) Post-Discharge Appointment Date and Location:
1) PCP Contacted on Admission: (Y/N) --> Name & Phone #: N. admitted on saturday. Dr. Mauricio Silvestre [phone]  2) Date of Contact with PCP:  3) PCP Contacted at Discharge: (Y/N)  4) Summary of Handoff Given to PCP:   5) Post-Discharge Appointment Date and Location:

## 2019-04-22 NOTE — CHART NOTE - NSCHARTNOTEFT_GEN_A_CORE
Upon Nutritional Assessment by the Registered Dietitian your patient was determined to meet criteria / has evidence of the following diagnosis/diagnoses:          [ ]  Mild Protein Calorie Malnutrition        [ ]  Moderate Protein Calorie Malnutrition        [x ] Severe Protein Calorie Malnutrition        [ ] Unspecified Protein Calorie Malnutrition        [ ] Underweight / BMI <19        [ ] Morbid Obesity / BMI > 40    Per physical assessment: Muscle Wasting- Temporal [ x  ]  Clavicle/Pectoral [   ]  Shoulder/Deltoid [  x ]  Scapula [   ]  Interosseous [   ]  Quadriceps [   ]  Gastrocnemius [   ]; Fat Wasting- Orbital [   ]  Buccal [   ]  Triceps [   ]  Rib [   ]--> Suspect severe PCM 2/2 to physical assessment, <75% EER being met >1 month, and suspected wt loss being masked by fluid retention (+16.8kg gain x2-3 months); please see malnutrition chart note.     Findings as based on:  •  Comprehensive nutrition assessment and consultation    Treatment:    The following diet has been recommended:    Regular diet + Ensure Enlive TID (1050 kcal, 60g protein, 540mL free H2O)         PROVIDER Section:     By signing this assessment you are acknowledging and agree with the diagnosis/diagnoses assigned by the Registered Dietitian    Comments:

## 2019-04-22 NOTE — DIETITIAN INITIAL EVALUATION ADULT. - PROBLEM SELECTOR PLAN 5
hx of chronic constipation - likely component of parkinsons and immobility. most recently BM day prior to admission  - bowel regiment with miralax will advance if doesn't have a bowel movement

## 2019-04-22 NOTE — PROGRESS NOTE ADULT - SUBJECTIVE AND OBJECTIVE BOX
OVERNIGHT EVENTS:    SUBJECTIVE / INTERVAL HPI: Patient seen and examined at bedside.     VITAL SIGNS:  Vital Signs Last 24 Hrs  T(C): 36.6 (2019 05:27), Max: 36.7 (2019 21:40)  T(F): 97.8 (2019 05:27), Max: 98 (2019 21:40)  HR: 62 (2019 05:27) (62 - 64)  BP: 162/90 (2019 05:27) (119/76 - 162/90)  BP(mean): --  RR: 16 (2019 05:27) (16 - 20)  SpO2: 96% (2019 05:27) (96% - 97%)    PHYSICAL EXAM:    General: WDWN  HEENT: NC/AT; PERRL, anicteric sclera; MMM  Neck: supple  Cardiovascular: +S1/S2, RRR  Respiratory: CTA B/L; no W/R/R  Gastrointestinal: soft, NT/ND; +BSx4  Extremities: WWP; no edema, clubbing or cyanosis  Vascular: 2+ radial, DP/PT pulses B/L  Neurological: AAOx3; no focal deficits    MEDICATIONS:  MEDICATIONS  (STANDING):  atorvastatin 10 milliGRAM(s) Oral at bedtime  carbidopa/levodopa  25/100 1 Tablet(s) Oral four times a day  emtricitabine 200 mG/rilpivirine 25 mG/tenofovir alafenamide 25 mG (ODEFSEY) Tablet 1 Tablet(s) Oral daily  heparin  Injectable 5000 Unit(s) SubCutaneous every 8 hours  polyethylene glycol 3350 17 Gram(s) Oral every 24 hours    MEDICATIONS  (PRN):      ALLERGIES:  Allergies    Cipro (Unknown)    Intolerances        LABS:                        10.4   6.26  )-----------( 414      ( 2019 06:13 )             33.1     04-    144  |  109<H>  |  21  ----------------------------<  99  3.4<L>   |  25  |  0.76    Ca    8.7      2019 06:14  Mg     2.1     -    TPro  6.4  /  Alb  3.7  /  TBili  0.3  /  DBili  x   /  AST  13  /  ALT  <5<L>  /  AlkPhos  78  04-      Urinalysis Basic - ( 2019 08:58 )    Color: Yellow / Appearance: Clear / S.025 / pH: x  Gluc: x / Ketone: Trace mg/dL  / Bili: NEGATIVE / Urobili: 0.2 E.U./dL   Blood: x / Protein: NEGATIVE mg/dL / Nitrite: NEGATIVE   Leuk Esterase: NEGATIVE / RBC: x / WBC x   Sq Epi: x / Non Sq Epi: x / Bacteria: x      CAPILLARY BLOOD GLUCOSE      POCT Blood Glucose.: 92 mg/dL (2019 08:41)      RADIOLOGY & ADDITIONAL TESTS: Reviewed. OVERNIGHT EVENTS: HATTIE    SUBJECTIVE / INTERVAL HPI: Patient seen and examined at bedside. no complaints this AM.     REVIEW OF SYSTEMS:    CONSTITUTIONAL: No fevers or chills  EYES/ENT: No visual or hearing changes; no throat pain or difficulty swallowing  NECK: No pain or stiffness  RESPIRATORY: No cough, wheezing; No shortness of breath  CARDIOVASCULAR: No chest pain or palpitations  GASTROINTESTINAL: No abdominal pain. No nausea, vomiting; No diarrhea or constipation.  GENITOURINARY: No dysuria, frequency or hematuria  NEUROLOGICAL: No numbness or weakness  SKIN: No itching, burning, rashes, or lesions   All other review of systems is negative unless indicated above.    VITAL SIGNS:  Vital Signs Last 24 Hrs  T(C): 36.6 (2019 05:27), Max: 36.7 (2019 21:40)  T(F): 97.8 (2019 05:27), Max: 98 (2019 21:40)  HR: 62 (2019 05:27) (62 - 64)  BP: 162/90 (2019 05:27) (119/76 - 162/90)  BP(mean): --  RR: 16 (2019 05:27) (16 - 20)  SpO2: 96% (2019 05:27) (96% - 97%)    PHYSICAL EXAM:    General: WDWN adult male laying in bed; NAD  HEENT: NC/AT; PERRL, anicteric sclera; MMM  Neck: supple  Cardiovascular: +S1/S2, RRR  Respiratory: CTA B/L; no W/R/R  Gastrointestinal: soft, NT/ND; +BSx4  Extremities: WWP; no edema, clubbing or cyanosis  Vascular: 2+ radial, DP/PT pulses B/L  Neurological: AAOx3; strength 4+/5 b/l UE and LE    MEDICATIONS:  MEDICATIONS  (STANDING):  atorvastatin 10 milliGRAM(s) Oral at bedtime  carbidopa/levodopa  25/100 1 Tablet(s) Oral four times a day  emtricitabine 200 mG/rilpivirine 25 mG/tenofovir alafenamide 25 mG (ODEFSEY) Tablet 1 Tablet(s) Oral daily  heparin  Injectable 5000 Unit(s) SubCutaneous every 8 hours  polyethylene glycol 3350 17 Gram(s) Oral every 24 hours    MEDICATIONS  (PRN):      ALLERGIES:  Allergies    Cipro (Unknown)    Intolerances        LABS:                        10.4   6.26  )-----------( 414      ( 2019 06:13 )             33.1     04-    144  |  109<H>  |  21  ----------------------------<  99  3.4<L>   |  25  |  0.76    Ca    8.7      2019 06:14  Mg     2.1         TPro  6.4  /  Alb  3.7  /  TBili  0.3  /  DBili  x   /  AST  13  /  ALT  <5<L>  /  AlkPhos  78        Urinalysis Basic - ( 2019 08:58 )    Color: Yellow / Appearance: Clear / S.025 / pH: x  Gluc: x / Ketone: Trace mg/dL  / Bili: NEGATIVE / Urobili: 0.2 E.U./dL   Blood: x / Protein: NEGATIVE mg/dL / Nitrite: NEGATIVE   Leuk Esterase: NEGATIVE / RBC: x / WBC x   Sq Epi: x / Non Sq Epi: x / Bacteria: x      CAPILLARY BLOOD GLUCOSE      POCT Blood Glucose.: 92 mg/dL (2019 08:41)      RADIOLOGY & ADDITIONAL TESTS: Reviewed.

## 2019-04-22 NOTE — PROGRESS NOTE ADULT - PROBLEM SELECTOR PLAN 1
Patient with multiple recent falls, unsteady on feet, unable to care for self at home, including malnutrition  - PT and OT consult - PT recommending [facility]  - nutrition consult for malnutrition  - social work and  consult - pt stated he has a SW on [address] that he was unable to reach today
Patient with multiple recent falls, unsteady on feet, unable to care for self at home, including malnutrition  - PT and OT consult - PT recommending [facility]  - nutrition consult for malnutrition  - social work and  consult

## 2019-04-22 NOTE — PROGRESS NOTE ADULT - PROBLEM SELECTOR PLAN 6
hx of normocytic anemia with RBCs ranging 8-10.5. likely AOCD vs nutritional deficiency  - currently 10.4  - f/u am iron studies  - f/u vit b12 and folate  - transfuse for goal hgb>7  - maintain active T and S
hx of normocytic anemia with RBCs ranging 8-10.5. likely AOCD vs nutritional deficiency  - currently 10.4  - f/u am iron studies - showing iron deficiency  - f/u vit b12 and folate  - transfuse for goal hgb>7  - maintain active T and S

## 2019-04-22 NOTE — PROGRESS NOTE ADULT - PROBLEM SELECTOR PLAN 2
Patient with hx of bph. self catheterizes at home leading to frequent UTI. does not report being on BPH meds. Aguero catheter placed at [facility] ED  - continue aguero for now  - obtain collateral with PCP
Patient with hx of bph. self catheterizes at home leading to frequent UTI. does not report being on BPH meds. Aguero catheter placed at [facility] ED  - continue aguero for now - will d/c upon discharge.

## 2019-04-22 NOTE — DIETITIAN INITIAL EVALUATION ADULT. - ENERGY NEEDS
ABW used for calculations as pt between % of IBW.   ABW 74.8kg, IBW 70kg, 105% IBW, ht 68", BMI 25.1   Nutrient needs based on [facility] standards of care for repletion in older adults, adjusted per HIV guidelines, FTT and suspected malnutrition

## 2019-04-22 NOTE — PROGRESS NOTE ADULT - PROBLEM SELECTOR PLAN 7
Pt with hx of R basal ganglia CVA  - continue ASA 81mg QD  - continue lipitor 10mg Qhs (home dose)
Pt with hx of R basal ganglia CVA  - continue ASA 81mg QD  - continue lipitor 10mg Qhs (home dose)

## 2019-04-22 NOTE — DIETITIAN INITIAL EVALUATION ADULT. - NS AS NUTRI INTERV ED CONTENT
Purpose of the nutrition education/Nutrition relationship to health/disease/discussed following regular diet +ONS to meet needs vs keto diet.

## 2019-04-22 NOTE — DIETITIAN INITIAL EVALUATION ADULT. - PROBLEM SELECTOR PLAN 2
Patient with hx of bph. self catheterizes at home leading to frequent UTI. does not report being on BPH meds. Aguero catheter placed at [facility] ED  - continue aguero for now  - obtain collateral with PCP

## 2019-04-22 NOTE — PROGRESS NOTE ADULT - ATTENDING COMMENTS
Patient was seen and examined with the resident team today.  I agree with Dr. Rangel's assessment and plan with the following exceptions/additions:     Briefly, this is an 86yo gentleman and  with a PMH of Parkinson's disease/dementia, CVA without residuals, HIV (on Odefsey, CD4/VL unknown), BPH (self catheterization) c/b recurrent UTI's and a history of complex coordination of care 2/2 premature terminations of [facility] admissions and refusal who re-presented to [facility] (after several visits this month alone) for recurrent falls and failure to thrive.  Patient reporting that he is now amenable to [facility]; however, noting he plans to go to [place] on 5/1 and unclear if and when he will return.  Considering joining some [facility]-related program down there.  No complaints other than difficulty w/ambulation.  Denies fevers, chills, CP, SOB, abdominal pain, diarrhea or LUTS.  Tolerating PO intake.  VSS.  Exam - nontoxic, NCAT, MMM, clear OP, CTA B no w/r/r, RRR no m/g/r, +BS soft NTND, WWP, AOx3, ?psychomotor retardation v bradykinetic, blunted affect but mostly appropriate.  Labs - CBC/BMP relatively unchanged.  Microdata - UCx (4/18) +E. faecium <100k w/initial U/A + but 4/20 negative w/o antibiotics.    -- SW, RE: [facility]  -- c/w Stevens but can d/c upon discharge   -- c/w HIV meds  -- c/w Sinemit (PD likely contributing to falls)  -- Nutrition consult  -- will not treat UCx as asymptomatic and suspect he is a colonizer  -- DVT PPx - [facility]  -- Dispo - medically cleared for [facility]  [phone]

## 2019-04-22 NOTE — PROGRESS NOTE ADULT - PROBLEM SELECTOR PLAN 8
F: none  E: replete prn  N: regular diet, nutrition consult for protein calorie malnutrition
F: none  E: replete prn  N: regular diet, nutrition consult for protein calorie malnutrition

## 2019-04-22 NOTE — PROGRESS NOTE ADULT - PROBLEM SELECTOR PLAN 3
hx of parkinsons disease  - continue home dose sinemet 25/100 QID
hx of parkinsons disease  - continue home dose sinemet 25/100 QID

## 2019-04-22 NOTE — DIETITIAN INITIAL EVALUATION ADULT. - OTHER INFO
85M with PMH of BPH, PD, CVA without residual deficits, chronic constipation, and HIV presents for FTT and back pain. Patient has multiple recent visits for falls. States that he has not been eating well due to not being able to make food or order food, unable to sit up d/t back pain. Of note pt recently DC 2/3/19 for sepsis and FTT to rehab. During recent ED visits, patient refused rehab and LTC. However, patient now worried that he would fall and not be able to get up prompting admission. Pt known to nutrition services from prior admit. Reports he follows keto diet at home because he "does not want to be fat, but build lots of muscle", states UBW 68kg and usually fluctuates 1-2kg, unable to illicit if intentional vs unintentional, states "this time my wt loss was rapid, which was good", questionable historian. Last admission pt wt 58kg indicating +16.8kg gain x2-3 months, recommend reweighing pt as does not appear to be 74.8kg + NFPE findings for fat/ muscle wasting, dx with PCM last admit, suspect same dx at this time, see malnutrition chart note. No noted n/v/d/c, no chewing/ swallowing issues however was on pureed diet last admission?, no pain reported, skin intact. Encouraged intake through day and encouraged to follow regular diet to meet needs.

## 2019-04-22 NOTE — PROGRESS NOTE ADULT - PROBLEM SELECTOR PLAN 5
hx of chronic constipation - likely component of parkinsons and immobility. most recently BM day prior to admission  - bowel regiment with miralax will advance if doesn't have a bowel movement
hx of chronic constipation - likely component of parkinsons and immobility. most recently BM day prior to admission  - bowel regiment with miralax will advance if doesn't have a bowel movement

## 2019-04-23 ENCOUNTER — TRANSCRIPTION ENCOUNTER (OUTPATIENT)
Age: 84
End: 2019-04-23

## 2019-04-23 VITALS
HEART RATE: 78 BPM | OXYGEN SATURATION: 98 % | SYSTOLIC BLOOD PRESSURE: 148 MMHG | TEMPERATURE: 98 F | DIASTOLIC BLOOD PRESSURE: 91 MMHG | RESPIRATION RATE: 18 BRPM

## 2019-04-23 PROCEDURE — 82962 GLUCOSE BLOOD TEST: CPT

## 2019-04-23 PROCEDURE — 83540 ASSAY OF IRON: CPT

## 2019-04-23 PROCEDURE — 97110 THERAPEUTIC EXERCISES: CPT

## 2019-04-23 PROCEDURE — 87086 URINE CULTURE/COLONY COUNT: CPT

## 2019-04-23 PROCEDURE — 81003 URINALYSIS AUTO W/O SCOPE: CPT

## 2019-04-23 PROCEDURE — 81001 URINALYSIS AUTO W/SCOPE: CPT

## 2019-04-23 PROCEDURE — 85610 PROTHROMBIN TIME: CPT

## 2019-04-23 PROCEDURE — 99285 EMERGENCY DEPT VISIT HI MDM: CPT | Mod: 25

## 2019-04-23 PROCEDURE — 86850 RBC ANTIBODY SCREEN: CPT

## 2019-04-23 PROCEDURE — 72170 X-RAY EXAM OF PELVIS: CPT

## 2019-04-23 PROCEDURE — 84484 ASSAY OF TROPONIN QUANT: CPT

## 2019-04-23 PROCEDURE — 83550 IRON BINDING TEST: CPT

## 2019-04-23 PROCEDURE — 87186 SC STD MICRODIL/AGAR DIL: CPT

## 2019-04-23 PROCEDURE — 84100 ASSAY OF PHOSPHORUS: CPT

## 2019-04-23 PROCEDURE — 99239 HOSP IP/OBS DSCHRG MGMT >30: CPT

## 2019-04-23 PROCEDURE — 84466 ASSAY OF TRANSFERRIN: CPT

## 2019-04-23 PROCEDURE — 80053 COMPREHEN METABOLIC PANEL: CPT

## 2019-04-23 PROCEDURE — 97162 PT EVAL MOD COMPLEX 30 MIN: CPT

## 2019-04-23 PROCEDURE — 93005 ELECTROCARDIOGRAM TRACING: CPT

## 2019-04-23 PROCEDURE — 36415 COLL VENOUS BLD VENIPUNCTURE: CPT

## 2019-04-23 PROCEDURE — 86900 BLOOD TYPING SEROLOGIC ABO: CPT

## 2019-04-23 PROCEDURE — 80307 DRUG TEST PRSMV CHEM ANLYZR: CPT

## 2019-04-23 PROCEDURE — 85730 THROMBOPLASTIN TIME PARTIAL: CPT

## 2019-04-23 PROCEDURE — 83880 ASSAY OF NATRIURETIC PEPTIDE: CPT

## 2019-04-23 PROCEDURE — 83735 ASSAY OF MAGNESIUM: CPT

## 2019-04-23 PROCEDURE — 82728 ASSAY OF FERRITIN: CPT

## 2019-04-23 PROCEDURE — 85025 COMPLETE CBC W/AUTO DIFF WBC: CPT

## 2019-04-23 PROCEDURE — 71045 X-RAY EXAM CHEST 1 VIEW: CPT

## 2019-04-23 PROCEDURE — 70450 CT HEAD/BRAIN W/O DYE: CPT

## 2019-04-23 PROCEDURE — 72100 X-RAY EXAM L-S SPINE 2/3 VWS: CPT

## 2019-04-23 PROCEDURE — 86901 BLOOD TYPING SEROLOGIC RH(D): CPT

## 2019-04-23 RX ORDER — CHOLECALCIFEROL (VITAMIN D3) 125 MCG
1 CAPSULE ORAL
Qty: 0 | Refills: 0 | COMMUNITY

## 2019-04-23 RX ADMIN — EMTRICITABINE, RILPIVIRINE HYDROCHLORIDE, AND TENOFOVIR DISOPROXIL FUMARATE 1 TABLET(S): 200; 25; 300 TABLET, FILM COATED ORAL at 12:04

## 2019-04-23 RX ADMIN — CARBIDOPA AND LEVODOPA 1 TABLET(S): 25; 100 TABLET ORAL at 12:04

## 2019-04-23 RX ADMIN — CARBIDOPA AND LEVODOPA 1 TABLET(S): 25; 100 TABLET ORAL at 02:00

## 2019-04-23 RX ADMIN — CARBIDOPA AND LEVODOPA 1 TABLET(S): 25; 100 TABLET ORAL at 06:48

## 2019-04-23 RX ADMIN — POLYETHYLENE GLYCOL 3350 17 GRAM(S): 17 POWDER, FOR SOLUTION ORAL at 06:47

## 2019-04-23 NOTE — DISCHARGE NOTE NURSING/CASE MANAGEMENT/SOCIAL WORK - NSDCPEPT PROEDMA_GEN_ALL_CORE
02/08/19 [address] Involvement Patient not active with Religious   Spiritual Beliefs/Perceptions   Concept of God Accepting   Relationship with God Close   Support Systems Children;Family members   Stress Factors   Patient Stress Factors Health changes   Coping Responses   Patient Coping Accepting;Open/discussion   Plan of Care   Comments Provided chaplaincy education  Cultivated a relationship of care and support  Listened empathically  Explored spiritual needs and resources-Pt stated she does not attend Religious but reads from the bible  Provided prayer  Explored relational needs and resrouces-pt lives with  with children close by  Talked about losing her mother some years ago and still having two surviving sisters  Explored emotional needs and resrouces-pt feels well  Some frurstration with being back in the hospital   Provided anticipatory guidance  Emotional resrouces utilized  Spiritual resources utilized  Verbally procssed emotions     Assessment Completed by: Unit visit Yes

## 2019-04-23 NOTE — DISCHARGE NOTE NURSING/CASE MANAGEMENT/SOCIAL WORK - NSDCDPATPORTLINK_GEN_ALL_CORE
You can access the [facility] Patient Portal, offered by [facility], by registering with the following website: [email or web address]

## 2019-04-23 NOTE — PROGRESS NOTE ADULT - SUBJECTIVE AND OBJECTIVE BOX
OVERNIGHT EVENTS:    SUBJECTIVE / INTERVAL HPI: Patient seen and examined at bedside.     VITAL SIGNS:  Vital Signs Last 24 Hrs  T(C): 36.8 (23 Apr 2019 05:27), Max: 36.8 (23 Apr 2019 05:27)  T(F): 98.2 (23 Apr 2019 05:27), Max: 98.2 (23 Apr 2019 05:27)  HR: 56 (23 Apr 2019 05:27) (56 - 64)  BP: 135/77 (23 Apr 2019 05:27) (102/69 - 135/77)  BP(mean): --  RR: 19 (23 Apr 2019 05:27) (18 - 20)  SpO2: 97% (23 Apr 2019 05:27) (94% - 98%)    PHYSICAL EXAM:    General: WDWN  HEENT: NC/AT; PERRL, anicteric sclera; MMM  Neck: supple  Cardiovascular: +S1/S2, RRR  Respiratory: CTA B/L; no W/R/R  Gastrointestinal: soft, NT/ND; +BSx4  Extremities: WWP; no edema, clubbing or cyanosis  Vascular: 2+ radial, DP/PT pulses B/L  Neurological: AAOx3; no focal deficits    MEDICATIONS:  MEDICATIONS  (STANDING):  atorvastatin 10 milliGRAM(s) Oral at bedtime  carbidopa/levodopa  25/100 1 Tablet(s) Oral four times a day  emtricitabine 200 mG/rilpivirine 25 mG/tenofovir alafenamide 25 mG (ODEFSEY) Tablet 1 Tablet(s) Oral daily  heparin  Injectable 5000 Unit(s) SubCutaneous every 8 hours  polyethylene glycol 3350 17 Gram(s) Oral every 24 hours    MEDICATIONS  (PRN):      ALLERGIES:  Allergies    Cipro (Unknown)    Intolerances        LABS:              CAPILLARY BLOOD GLUCOSE          RADIOLOGY & ADDITIONAL TESTS: Reviewed.

## 2019-04-23 NOTE — DISCHARGE NOTE NURSING/CASE MANAGEMENT/SOCIAL WORK - NSDCPEPTSTRK_GEN_ALL_CORE
Stroke support groups for patients, families, and friends/Need for follow up after discharge/Prescribed medications/Stroke education booklet/Call 911 for stroke/Risk factors for stroke/Stroke warning signs and symptoms/Signs and symptoms of stroke

## 2019-04-23 NOTE — DISCHARGE NOTE PROVIDER - NSDCCPCAREPLAN_GEN_ALL_CORE_FT
PRINCIPAL DISCHARGE DIAGNOSIS  Diagnosis: Failure to thrive in adult  Assessment and Plan of Treatment: Please follow up with physicians at your [facility] and work with physical therapy to regain strength

## 2019-04-23 NOTE — DISCHARGE NOTE PROVIDER - HOSPITAL COURSE
84 yo M with PMH of BPH (self catheterization at home), parkinsons disease, CVA without residual deficits, chronic constipation, and HIV (on Odefsey) who presents to [facility] via [facility] ED for failure to thrive and back pain. Imaging negative for acute pathology. Of note, pt has presented to ED and [facility] multiple times - dispo'd to [facility] and then refused [facility]. Subsequently, he goes home, feels unsafe and returns to ED. Pt agreed to [facility] this admission and will be d/c'd to [facility].
Emergent/ED

## 2019-04-29 DIAGNOSIS — Z66 DO NOT RESUSCITATE: ICD-10-CM

## 2019-04-29 DIAGNOSIS — D64.9 ANEMIA, UNSPECIFIED: ICD-10-CM

## 2019-04-29 DIAGNOSIS — Z21 ASYMPTOMATIC HUMAN IMMUNODEFICIENCY VIRUS [HIV] INFECTION STATUS: ICD-10-CM

## 2019-04-29 DIAGNOSIS — M54.5 LOW BACK PAIN: ICD-10-CM

## 2019-04-29 DIAGNOSIS — K59.00 CONSTIPATION, UNSPECIFIED: ICD-10-CM

## 2019-04-29 DIAGNOSIS — F02.80 DEMENTIA IN OTHER DISEASES CLASSIFIED ELSEWHERE, UNSPECIFIED SEVERITY, WITHOUT BEHAVIORAL DISTURBANCE, PSYCHOTIC DISTURBANCE, MOOD DISTURBANCE, AND ANXIETY: ICD-10-CM

## 2019-04-29 DIAGNOSIS — E46 UNSPECIFIED PROTEIN-CALORIE MALNUTRITION: ICD-10-CM

## 2019-04-29 DIAGNOSIS — N40.1 BENIGN PROSTATIC HYPERPLASIA WITH LOWER URINARY TRACT SYMPTOMS: ICD-10-CM

## 2019-04-29 DIAGNOSIS — G31.83 NEUROCOGNITIVE DISORDER WITH LEWY BODIES: ICD-10-CM

## 2019-04-29 DIAGNOSIS — Z86.73 PERSONAL HISTORY OF TRANSIENT ISCHEMIC ATTACK (TIA), AND CEREBRAL INFARCTION WITHOUT RESIDUAL DEFICITS: ICD-10-CM

## 2019-04-29 DIAGNOSIS — R33.8 OTHER RETENTION OF URINE: ICD-10-CM

## 2019-04-29 DIAGNOSIS — R62.7 ADULT FAILURE TO THRIVE: ICD-10-CM

## 2019-06-06 NOTE — ED PROVIDER NOTE - CROS ED CONS ALL NEG
[>50% of Time Spent on Counseling for ____] : Greater than 50% of the encounter time was spent on counseling for [unfilled] [Time Spent: ___ minutes] : I have spent [unfilled] minutes of face to face time with the patient negative...

## 2019-10-23 NOTE — ED CDU PROVIDER INITIAL DAY NOTE - MUSCULOSKELETAL, MLM
Caller went for refills on Colace 100 mg but was told by pharmacy that it was not available  Although it appears there are 5 refills, phakenneth does not see that  Can we call pharmacy to update  Spine appears normal, range of motion is not limited, no muscle or joint tenderness

## 2019-12-03 NOTE — H&P ADULT - NSHPPOAPULMEMBOLUS_GEN_A_CORE
Patient:   VILLA SANCHEZ            MRN: [id number]            FIN: [id number]              Age:   76 years     Sex:  MALE     :  42   Associated Diagnoses:   None   Author:   MARIN ELLIOTT     vss/afebrile  dressing dry and intact  no neuro changes noted  PT   no

## 2020-10-07 NOTE — H&P ADULT - NSICDXNOPASTSURGICALHX_GEN_ALL_CORE
<-- Click to add NO significant Past Surgical History Area M Indication Text: Tumors in this location are included in Area M (cheek, forehead, scalp, neck, jawline and pretibial skin).  Mohs surgery is indicated for tumors in these anatomic locations.

## 2020-11-30 NOTE — PATIENT PROFILE ADULT - NSPROMEDSADMININFO_GEN_A_NUR
Leopoldo Acosta called requesting a refill on the following medications:  Requested Prescriptions     Pending Prescriptions Disp Refills    HYDROcodone-acetaminophen (NORCO) 5-325 MG per tablet 60 tablet 0     Sig: Take 1 tablet by mouth 2 times daily as needed for Pain for up to 30 days.      Pharmacy verified:  yes      Date of last visit: 10-  Date of next visit (if applicable): 52/35/8357
[facility] reviewed, last fill date for Norco 10/29/2020. - Norco not detected in UDS.      Last seen: 10/27/2020    Follow-up: 12/29/2020
Patient notified and voiced understanding
Please tell patient that medications need to be in UDS if taking them
no concerns

## 2020-12-21 NOTE — PHYSICAL THERAPY INITIAL EVALUATION ADULT - CRITERIA FOR SKILLED THERAPEUTIC INTERVENTIONS
Therapy and other behavioral health support contact number:  [facility]: [phone]       therapy frequency/impairments found/rehab potential/anticipated discharge recommendation PROBLEM DIAGNOSES  Problem: Benign neoplasm of left orbit  Assessment and Plan:  Left Orbitotomy.

## 2021-10-14 NOTE — PATIENT PROFILE ADULT - DO YOU FEEL UNSAFE AT SCHOOL?
[FreeTextEntry1] : Tylenol, Motrin with cyclobenzaprine PRN, with heat therapy. Medrol dose pack to help reduce inflammation and CT Lumbar spine to rule out disc hernation. 
not applicable

## 2021-12-16 NOTE — PHYSICAL THERAPY INITIAL EVALUATION ADULT - NS ASR RISK AREAS PT EVAL
Post Acute Skilled Nursing Home Subsequent Visit Note     Date of Service: 12/16/2021  Location seen at: [facility]  Subacute / Skilled Need: Rehabilitation    PCP: Moraima BROWN MD   Patient Care Team:  Moraima BROWN MD as PCP - General  Raisa Pizarro as RN Care Coordinator (Registered Nurse)  Nadege Valdez RN as Care Transitions RN (Registered Nurse)  Carmelina Appiah MD as Post Acute Facility Provider: Physician (Geriatric Medicine)  RAMON Celestin as Post Acute Facility Provider: APC (Nurse Practitioner - Family)  Seen by RAMON Celestin today    Connie Cid is a 89 year old female presenting to Post Acute Skilled Nursing for: right rib fractures.   History of Present Illness:   Patient is seen today for a subsequent visit to discuss patient's pain concerns.  Patient reports she is having some radiating pain in her right rib area, this is limiting her ability to reposition and assume a standing position for skilled therapies.  We discussed adjusting her analgesic regimen, including scheduling and adjusting the dosages; patient is amenable.  She denies any worsening SOB or respiratory symptoms, at rest or exertionally.    Hospital Course:  Patient was recently hospitalized following a fall at her half-way.  Head imaging was performed, revealing no acute intracranial processes.  Chest imaging identified a recurrent right-sided pleural effusion, and patient underwent thoracenteses on 12/2 and 12/6, with fluid removals of 800 ml and 600 ml.  Fluid cytoscopy and cultures were negative.  Imaging also revealed fractures of right 8th and 10th ribs.  Pain management recommended and performed an intercostal nerve block.  Patient reported minimal relief following the procedure, but declined a repeat attempt.  Patient was recommended a multimodal oral analgesic regimen, including Tylenol, Tramadol, Lidoderm patches, Tizanidine and Gabapentin.  Patient  discharged to subacute setting for continued skilled therapies.      HISTORY  Past Medical History:   Diagnosis Date   • ABDOMINAL PAIN    • Arthritis     all joints   • Bronchitis    • Chronic pain     joints   • CONFUSION    • ESOPHAGITIS REFLUX    • Fracture     wrists, arm,    • Gastroesophageal reflux disease    • Hypertension    • Liver disease     abcess on liver   • Mixed stress and urge urinary incontinence    • OC (onychocryptosis)    • OM (onychomycosis)    • Osteoporosis 2010    on actonel   • PMH of 2002    broken left radius, no surgery   • PMH of 10/01/2008    fracture right wrist   • PMH of 1950    multiple fractures - femurs & pelvis   • Sinusitis, chronic     chronic sinus drainage   • Special screening for malignant neoplasms, colon 2014    Carlotta/Gal   • Varicose vein of leg     right      reports that she quit smoking about 62 years ago. Her smoking use included cigarettes. She has a 2.50 pack-year smoking history. She has never used smokeless tobacco. She reports current alcohol use of about 1.0 standard drink of alcohol per week. She reports that she does not use drugs.  Past Surgical History:   Procedure Laterality Date   • Carpal tunnel release  2009    Carpal Tunnel, right   •  delivery+postpartum care  1951       •  delivery+postpartum care  11/15/1952       •  delivery+postpartum care  1951       •  delivery+postpartum care  1952       • Colonoscopy diagnostic  2007    There were numerous diverticula in the ascending and proximal transverse colon, no gross evidence fo diverticulitis.     • Colonoscopy diagnostic  2003    Diveticulosis coli, otherwise neg.   • Colorec canc scrn,colonoscopy not hi risk  2014    Dr Santo/no further screening recommended due to age   • Elbow surgery Right     dislocated, pin insertion   • Esophagogastroduodenoscopy transoral  flex w/bx single or mult  08/12/2007    Antrum bx: acute ulceration gastritis w/background mucosal changes most consistent w/chronic reactive gastropathy to chemical injury.   • Esophagogastroduodenoscopy transoral flex w/bx single or mult  03/17/2003    GE junction bx: reflux esophagitis.  Gastric type mucosa w/moderate chronic inflammation, neg for intestinal metaplasia and dysplasia.  Sigmoid polyp: hyperplastic polyp.   • Esophagogastroduodenoscopy transoral flex w/bx single or mult  08/11/2009   • Extracapsular cataract removal w insert io lens prosth wo ecp  01/01/2003    Cataract Removal Lens Implant left eye.   • Extracapsular cataract removal w insert io lens prosth wo ecp  02/01/2003    Cataract Removal Lens Implant right eye.   • Femur/knee surg unlisted  05/01/1950    multiple fxs - Femur plates placed bilaterall, in body cast   • Forearm/wrist surgery unlisted  10/08    Wrist procedure   • Removal gallbladder  09/01/1993    Cholecystectomy (gallbladder)   • Shoulder surg proc unlisted  01/01/1980    Right rotator cuff    • Shoulder surg proc unlisted  12/01/2006    Left rotator cuff   • Tonsillectomy     • Total hip replacement  11/01/1999    Hip replacement, right   • Total hip replacement  05/01/2011    Hip replacement left   • Total knee replacement  01/01/1993    Left Knee Replacement   • Total knee replacement  01/01/2002    Right Knee Replacement   • Total knee replacement  11/01/2002    Left Knee-cap Replacement     Family History   Problem Relation Age of Onset   • Cancer Mother         colorectal    • Cancer Sister         colorectal   • Cancer Brother         prostate/liver     History     Not marked as reviewed during this visit.          PROBLEM LIST:  Patient Active Problem List   Diagnosis   • Osteoarthritis   • Osteoporosis   • Urinary, incontinence, stress female   • GERD (gastroesophageal reflux disease)   • HTN (hypertension)   • Mixed stress and urge urinary incontinence   • Bilateral  foot pain   • Dermatophytosis of nail   • Pain in joint, ankle and foot   • Thoracic or lumbosacral neuritis or radiculitis, unspecified   • Spinal stenosis, unspecified region other than cervical   • Degeneration of lumbar or lumbosacral intervertebral disc   • Degeneration of cervical intervertebral disc   • Plantar fasciitis of right foot   • Shortness of breath   • Aortic valve disorder   • Other persistent atrial fibrillation (CMS/HCC)   • Chronic heart failure with preserved ejection fraction (HFpEF) (CMS/HCC)   • 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    Does the patient have the capacity to make healthcare decisions: Yes, the patient is able to receive, process, and then give feedback to information regarding medical discussions.      Are the current Advance Directive documents consistent with the Patient's current wishes: Yes      Summary of Discussion:     Does the patient have a state-issued Ambulatory Code Status ([place]-POLST [place]-DNR) order: Yes  After discussion, the patient has decided on Do Not Resuscitate    Discussion Start Time: 0800  Discussion End Time: 0816    RAMON Celestin               DEPRESSION SCREENING:  Recent PHQ 2/9 Score    PHQ 2:  Date Adult PHQ 2 Score Adult PHQ 2 Interpretation   11/28/2021 2 No further screening needed       PHQ 9:  Date Adult PHQ 9 Score Adult PHQ 9 Interpretation   9/7/2021 6 Mild Depression       DEPRESSION ASSESSMENT/PLAN:  Depression screening is negative no further plan  needed.    ALLERGIES:  Allergies as of 12/16/2021 - Reviewed 12/08/2021   Allergen Reaction Noted   • Mirabegron Other (See Comments) 10/28/2016   • Morphine sulfate HIVES 03/23/2018       CURRENT MEDICATIONS:   Current Outpatient Medications   Medication Sig Dispense Refill   • colchicine (COLCRYS) 0.6 MG tablet 0.3 mg two times per week( Monday and Thursday) 30 tablet 3   • gabapentin (NEURONTIN) 100 MG capsule Take 1 capsule by mouth every 12 hours. 60 capsule 0   • lidocaine (LIDOCARE) 4 % patch Place 2 patches onto the skin daily. Apply to the right lower rib cage.     • tiZANidine (ZANAFLEX) 2 MG tablet Take 1 tablet by mouth every 8 hours as needed (muscle spams). 30 tablet 0   • traMADol (ULTRAM) 50 MG tablet 25 mg daily and 50 mg at night( until acute pain is resolved) 30 tablet 0   • metoPROLOL succinate (TOPROL-XL) 25 MG 24 hr tablet Take 25 mg by mouth 2 times daily. Take 1 tablet with a 50mg tablet for 75mg total     • metoPROLOL succinate (TOPROL-XL) 50 MG 24 hr tablet Take 50 mg by mouth daily. Take 1 tablet with a 25mg tablet for 75mg total     • pravastatin (PRAVACHOL) 20 MG tablet Take 1 tablet by mouth nightly. 30 tablet 0   • Eliquis 2.5 MG Tab TAKE 1 TABLET BY MOUTH EVERY 12 HOURS. 180 tablet 3   • furosemide (LASIX) 40 MG tablet Take 1 tablet by mouth 2 times daily. 120 tablet 0   • Potassium 99 MG Tab Take 99 mg by mouth 3 days a week. Tuesday, Thursday and Saturday.  30 tablet 0   • Calcium Carbonate-Vit D-Min (Calcium 1200) 3942-1060 MG-UNIT Chew Tab Chew 1 tablet by mouth daily.     • Multiple Vitamins-Minerals (CENTRUM SILVER PO) Take 1 tablet by mouth daily.     • loperamide (IMODIUM A-D) 2 MG tablet Take 2 mg by mouth as needed (to prevent Diarrhea.). To be taken when away from home.     • Magnesium 400 MG Tab Take 250 mg by mouth 3 days a week. Monday, Wednesday and Friday.     • Omega-3 Fatty Acids (FISH OIL) 1200 MG capsule Take 1,200 mg by mouth daily. Monday/Wednesday/Friday     •  Biotin 2.5 MG Tab Take 1 tablet by mouth 3 days a week. Monday/Wednesday/Friday     • cholecalciferol (VITAMIN D) 25 mcg (1,000 units) tablet Take 50 mcg by mouth 2 times daily.      • acetaminophen (TYLENOL EX ST ARTHRITIS PAIN) 500 MG tablet Take 500 mg by mouth nightly.  30 tablet 0     No current facility-administered medications for this visit.     Medications reviewed / reconciled: Yes    BASELINE FUNCTIONAL STATUS:  Walker    CURRENT FUNCTIONAL STATUS:  Walker and Wheelchair    DIET:  Consistency: General   Type: regular  Appetite: Normal    REVIEW OF SYSTEMS:  Review of Systems   Constitutional: Negative.    HENT: Negative.    Respiratory: Negative.    Cardiovascular: Negative.    Gastrointestinal: Negative.    Genitourinary: Negative.    Musculoskeletal: Positive for arthralgias.   Skin: Negative.    Neurological: Negative.    Psychiatric/Behavioral: Negative.        VITALS:  Vitals:    12/16/21 0816   BP: 128/70   Pulse: 68   Resp: 16   Temp: 98.6 °F (37 °C)   SpO2: 92%   PainSc: 7    LMP: 11/25/1967       PHYSICAL ASSESSMENT:  Physical Exam  Vitals reviewed.   Constitutional:       Appearance: Normal appearance.   Eyes:      Pupils: Pupils are equal, round, and reactive to light.   Cardiovascular:      Rate and Rhythm: Normal rate and regular rhythm.      Pulses: Normal pulses.      Heart sounds: No murmur heard.      Pulmonary:      Effort: Pulmonary effort is normal. No respiratory distress.      Breath sounds: Normal breath sounds. No rhonchi or rales.   Abdominal:      General: Bowel sounds are normal.      Palpations: Abdomen is soft.   Musculoskeletal:      Cervical back: Normal range of motion.      Right lower leg: No edema.      Left lower leg: No edema.   Skin:     General: Skin is warm.      Capillary Refill: Capillary refill takes less than 2 seconds.   Neurological:      General: No focal deficit present.      Mental Status: She is alert and oriented to person, place, and time.   Psychiatric:          Mood and Affect: Mood normal.         Behavior: Behavior normal.         LABS:  CBC:   WBC (K/mcL)   Date Value   12/09/2021 9.5     RBC (mil/mcL)   Date Value   12/09/2021 4.70     HGB (g/dL)   Date Value   12/09/2021 13.1     PLT (K/mcL)   Date Value   12/09/2021 406   , BMP:   Sodium (mmol/L)   Date Value   12/09/2021 129 (L)     Potassium (mmol/L)   Date Value   12/09/2021 4.3     Chloride (mmol/L)   Date Value   12/09/2021 96 (L)     Glucose (mg/dL)   Date Value   12/09/2021 87     Calcium (mg/dL)   Date Value   12/09/2021 9.6     Carbon Dioxide (mmol/L)   Date Value   12/09/2021 28     BUN (mg/dL)   Date Value   12/09/2021 36 (H)     Creatinine (mg/dL)   Date Value   12/09/2021 1.35 (H)    and LIPID Panel:   CHOLESTEROL (mg/dL)   Date Value   09/05/2018 199     HDL (mg/dL)   Date Value   09/05/2018 89     CHOL/HDL (no units)   Date Value   09/05/2018 2.2     TRIGLYCERIDE (mg/dL)   Date Value   09/05/2018 89     CALCULATED LDL (mg/dL)   Date Value   09/05/2018 92       ASSESSMENT AND PLAN  Assessment     Multiple right rib fractures (primary encounter diagnosis)    Plan:  Continue PT/OT with adequate pain management.  Tylenol, Tramadol, Lidoderm, Tizanidine and Gabapentin.  Scheduling Tylenol 1,000 mg TID.  Scheduling Tizanidine TID.  Increasing Gabapentin to TID.  Repeat imaging if pain or respiratory status acutely worsens.    Chronic heart failure with preserved ejection fraction (HFpEF) (CMS/HCC)   Plan: Chronic, stable.  Continue Lasix 40 mg daily with potassium supplement.  Monitor weight daily, and for signs of fluid volume overload.  Monitor BMP weekly.  Baseline creatinine 1.2-1.4.    Other persistent atrial fibrillation (CMS/HCC)    Plan: Chronic, stable.  Continue Eliquis.  Rate controlled on Metoprolol.    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  Continue Lasix 40 mg daily with potassium supplement.  Monitor weight daily, and for  signs of fluid volume overload.  Monitor BMP weekly.  Baseline creatinine 1.2-1.4.      Stage 3b chronic kidney disease (CMS/HCC)    Plan: Chronic, stable.  Continue Lasix 40 mg daily with potassium supplement.  Monitor weight daily, and for signs of fluid volume overload.  Monitor BMP weekly.  Baseline creatinine 1.2-1.4.  Avoid NSAIDs and nephrotoxic agents.    Pleural effusion on right    Plan: Recurrent, thoracenteses on 12/2 and 12/6.  Appears nonmalignant.  Optimize diuretic therapy, currently on Lasix 40 mg daily.  Monitor for respiratory symptoms and repeat imaging as clinically appropriate.  Repeat thoracentesis as required for symptom management.      FOLLOW UP APPOINTMENTS:  Future Appointments   Date Time Provider Department Center   1/28/2022 10:30 AM Moraima BROWN MD [facility]   1/28/2022  2:15 PM [facility]       DISCHARGE PLANNING:     Prognosis: good    Discussed with: Patient    Barriers to discharge: therapy needs    Anticipated disposition: Home With Home Health     Total time spent is more than 45 minutes, with more than 50% of the time spent in coordination of care, counseling, review of records and discussion of plan of care with the patient /staff /family.    RAMON Celestin   fall

## 2022-08-09 NOTE — PROGRESS NOTE ADULT - PROBLEM/PLAN-6
[facility] NURSE CALL:  Spoke with patient she is having recurring dizzy spells x 5 days ,some nausea,s.o.b,legs very unsteady going up steps ,also having shoulder discomfort .
DISPLAY PLAN FREE TEXT
DISPLAY PLAN FREE TEXT

## 2023-02-27 NOTE — ED PROVIDER NOTE - CARDIAC, MLM
Patient called this evening asking to reschedule [facility] appointment  Unable to reschedule appointment  He stated that Neurology would be able to help  Please call him at your earliest convenience  Thank you 
Normal rate, regular rhythm.    No murmurs, rubs or gallops.

## 2024-05-14 NOTE — ED PROVIDER NOTE - GASTROINTESTINAL, MLM
GLENDAW scheduled pt's hospital f/u visit on 5/22/24 @ 2:30 pm.   Abdomen soft, non-tender, no guarding.
